# Patient Record
Sex: FEMALE | Race: OTHER | NOT HISPANIC OR LATINO
[De-identification: names, ages, dates, MRNs, and addresses within clinical notes are randomized per-mention and may not be internally consistent; named-entity substitution may affect disease eponyms.]

---

## 2017-07-06 ENCOUNTER — TRANSCRIPTION ENCOUNTER (OUTPATIENT)
Age: 47
End: 2017-07-06

## 2017-08-01 ENCOUNTER — TRANSCRIPTION ENCOUNTER (OUTPATIENT)
Age: 47
End: 2017-08-01

## 2019-01-07 ENCOUNTER — TRANSCRIPTION ENCOUNTER (OUTPATIENT)
Age: 49
End: 2019-01-07

## 2019-01-23 ENCOUNTER — TRANSCRIPTION ENCOUNTER (OUTPATIENT)
Age: 49
End: 2019-01-23

## 2019-03-01 ENCOUNTER — TRANSCRIPTION ENCOUNTER (OUTPATIENT)
Age: 49
End: 2019-03-01

## 2019-04-04 ENCOUNTER — TRANSCRIPTION ENCOUNTER (OUTPATIENT)
Age: 49
End: 2019-04-04

## 2020-08-27 ENCOUNTER — APPOINTMENT (OUTPATIENT)
Dept: SURGERY | Facility: CLINIC | Age: 50
End: 2020-08-27
Payer: COMMERCIAL

## 2020-08-27 VITALS
WEIGHT: 205 LBS | OXYGEN SATURATION: 98 % | DIASTOLIC BLOOD PRESSURE: 86 MMHG | HEIGHT: 62 IN | BODY MASS INDEX: 37.73 KG/M2 | RESPIRATION RATE: 16 BRPM | TEMPERATURE: 96.4 F | SYSTOLIC BLOOD PRESSURE: 129 MMHG | HEART RATE: 88 BPM

## 2020-08-27 DIAGNOSIS — Z01.818 ENCOUNTER FOR OTHER PREPROCEDURAL EXAMINATION: ICD-10-CM

## 2020-08-27 DIAGNOSIS — Z63.5 DISRUPTION OF FAMILY BY SEPARATION AND DIVORCE: ICD-10-CM

## 2020-08-27 DIAGNOSIS — Z83.3 FAMILY HISTORY OF DIABETES MELLITUS: ICD-10-CM

## 2020-08-27 DIAGNOSIS — R13.10 DYSPHAGIA, UNSPECIFIED: ICD-10-CM

## 2020-08-27 DIAGNOSIS — Z98.84 BARIATRIC SURGERY STATUS: ICD-10-CM

## 2020-08-27 DIAGNOSIS — Z78.9 OTHER SPECIFIED HEALTH STATUS: ICD-10-CM

## 2020-08-27 PROCEDURE — 99202 OFFICE O/P NEW SF 15 MIN: CPT

## 2020-08-27 RX ORDER — LEVOTHYROXINE SODIUM 0.17 MG/1
TABLET ORAL
Refills: 0 | Status: ACTIVE | COMMUNITY

## 2020-08-27 SDOH — SOCIAL STABILITY - SOCIAL INSECURITY: DISRUPTION OF FAMILY BY SEPARATION AND DIVORCE: Z63.5

## 2020-08-27 NOTE — ASSESSMENT
[FreeTextEntry1] : 10 he is status post lap band placement with LAP-BAND intolerance.  Question of autoimmune disease secondary to LAP-BAND as well.

## 2020-08-27 NOTE — PHYSICAL EXAM
[Normal] : affect appropriate [Obese, well nourished, in no acute distress] : obese, well nourished, in no acute distress [de-identified] : Normoactive bowel sounds, no hepatosplenomegaly, no masses, non-tender.  Healed wound status post laparoscopic gastric banding.  Port in place infraumbilical

## 2020-08-27 NOTE — PLAN
[FreeTextEntry1] : Removal of lap band and port\par \par The risks benefits and alternatives were discussed at length with drawings and all of her questions were answered.\par The patient appears to understand and wishes to proceed.

## 2020-08-27 NOTE — HISTORY OF PRESENT ILLNESS
[de-identified] : Fernando is a 51 y/o female here for consultation for lap-band removal. She is s/p 2012- gastric banding by Dr. Ortiz. Reported pre-op weight was 190 lbs. Patient denies ever being able to utilize band.  When there is fluid in it she has dysphasia inability to tolerate solids with no fluid she gets no benefit.  Has not lost any weight placement.\par Overall health good.  PCP recommends lap band removal

## 2020-08-27 NOTE — REASON FOR VISIT
[Initial Consult] : an initial consult for [S/P Bariatric Surgery] : s/p bariatric surgery [Nausea/Vomiting] : nausea/vomiting

## 2020-09-10 ENCOUNTER — OUTPATIENT (OUTPATIENT)
Dept: OUTPATIENT SERVICES | Facility: HOSPITAL | Age: 50
LOS: 1 days | End: 2020-09-10
Payer: COMMERCIAL

## 2020-09-10 VITALS
TEMPERATURE: 98 F | WEIGHT: 208.12 LBS | SYSTOLIC BLOOD PRESSURE: 120 MMHG | HEIGHT: 62 IN | HEART RATE: 78 BPM | RESPIRATION RATE: 20 BRPM | DIASTOLIC BLOOD PRESSURE: 70 MMHG

## 2020-09-10 DIAGNOSIS — Z01.818 ENCOUNTER FOR OTHER PREPROCEDURAL EXAMINATION: ICD-10-CM

## 2020-09-10 DIAGNOSIS — E66.01 MORBID (SEVERE) OBESITY DUE TO EXCESS CALORIES: ICD-10-CM

## 2020-09-10 DIAGNOSIS — Z29.9 ENCOUNTER FOR PROPHYLACTIC MEASURES, UNSPECIFIED: ICD-10-CM

## 2020-09-10 LAB
A1C WITH ESTIMATED AVERAGE GLUCOSE RESULT: 6.4 % — HIGH (ref 4–5.6)
ALBUMIN SERPL ELPH-MCNC: 4.1 G/DL — SIGNIFICANT CHANGE UP (ref 3.3–5.2)
ALP SERPL-CCNC: 85 U/L — SIGNIFICANT CHANGE UP (ref 40–120)
ALT FLD-CCNC: 34 U/L — HIGH
ANION GAP SERPL CALC-SCNC: 11 MMOL/L — SIGNIFICANT CHANGE UP (ref 5–17)
AST SERPL-CCNC: 30 U/L — SIGNIFICANT CHANGE UP
BASOPHILS # BLD AUTO: 0.02 K/UL — SIGNIFICANT CHANGE UP (ref 0–0.2)
BASOPHILS NFR BLD AUTO: 0.3 % — SIGNIFICANT CHANGE UP (ref 0–2)
BILIRUB SERPL-MCNC: 0.2 MG/DL — LOW (ref 0.4–2)
BLD GP AB SCN SERPL QL: SIGNIFICANT CHANGE UP
BUN SERPL-MCNC: 16 MG/DL — SIGNIFICANT CHANGE UP (ref 8–20)
CALCIUM SERPL-MCNC: 9.5 MG/DL — SIGNIFICANT CHANGE UP (ref 8.6–10.2)
CHLORIDE SERPL-SCNC: 105 MMOL/L — SIGNIFICANT CHANGE UP (ref 98–107)
CO2 SERPL-SCNC: 22 MMOL/L — SIGNIFICANT CHANGE UP (ref 22–29)
CREAT SERPL-MCNC: 0.66 MG/DL — SIGNIFICANT CHANGE UP (ref 0.5–1.3)
EOSINOPHIL # BLD AUTO: 0.17 K/UL — SIGNIFICANT CHANGE UP (ref 0–0.5)
EOSINOPHIL NFR BLD AUTO: 2.9 % — SIGNIFICANT CHANGE UP (ref 0–6)
ESTIMATED AVERAGE GLUCOSE: 137 MG/DL — HIGH (ref 68–114)
GLUCOSE SERPL-MCNC: 103 MG/DL — HIGH (ref 70–99)
HCT VFR BLD CALC: 42 % — SIGNIFICANT CHANGE UP (ref 34.5–45)
HGB BLD-MCNC: 13.7 G/DL — SIGNIFICANT CHANGE UP (ref 11.5–15.5)
IMM GRANULOCYTES NFR BLD AUTO: 0.2 % — SIGNIFICANT CHANGE UP (ref 0–1.5)
LYMPHOCYTES # BLD AUTO: 1.88 K/UL — SIGNIFICANT CHANGE UP (ref 1–3.3)
LYMPHOCYTES # BLD AUTO: 32.1 % — SIGNIFICANT CHANGE UP (ref 13–44)
MAGNESIUM SERPL-MCNC: 2.2 MG/DL — SIGNIFICANT CHANGE UP (ref 1.6–2.6)
MCHC RBC-ENTMCNC: 29.2 PG — SIGNIFICANT CHANGE UP (ref 27–34)
MCHC RBC-ENTMCNC: 32.6 GM/DL — SIGNIFICANT CHANGE UP (ref 32–36)
MCV RBC AUTO: 89.6 FL — SIGNIFICANT CHANGE UP (ref 80–100)
MONOCYTES # BLD AUTO: 0.53 K/UL — SIGNIFICANT CHANGE UP (ref 0–0.9)
MONOCYTES NFR BLD AUTO: 9.1 % — SIGNIFICANT CHANGE UP (ref 2–14)
NEUTROPHILS # BLD AUTO: 3.24 K/UL — SIGNIFICANT CHANGE UP (ref 1.8–7.4)
NEUTROPHILS NFR BLD AUTO: 55.4 % — SIGNIFICANT CHANGE UP (ref 43–77)
PHOSPHATE SERPL-MCNC: 3.2 MG/DL — SIGNIFICANT CHANGE UP (ref 2.4–4.7)
PLATELET # BLD AUTO: 314 K/UL — SIGNIFICANT CHANGE UP (ref 150–400)
POTASSIUM SERPL-MCNC: 4.5 MMOL/L — SIGNIFICANT CHANGE UP (ref 3.5–5.3)
POTASSIUM SERPL-SCNC: 4.5 MMOL/L — SIGNIFICANT CHANGE UP (ref 3.5–5.3)
PROT SERPL-MCNC: 7.5 G/DL — SIGNIFICANT CHANGE UP (ref 6.6–8.7)
RBC # BLD: 4.69 M/UL — SIGNIFICANT CHANGE UP (ref 3.8–5.2)
RBC # FLD: 13 % — SIGNIFICANT CHANGE UP (ref 10.3–14.5)
SODIUM SERPL-SCNC: 138 MMOL/L — SIGNIFICANT CHANGE UP (ref 135–145)
WBC # BLD: 5.85 K/UL — SIGNIFICANT CHANGE UP (ref 3.8–10.5)
WBC # FLD AUTO: 5.85 K/UL — SIGNIFICANT CHANGE UP (ref 3.8–10.5)

## 2020-09-10 PROCEDURE — G0463: CPT

## 2020-09-10 PROCEDURE — 93010 ELECTROCARDIOGRAM REPORT: CPT

## 2020-09-10 PROCEDURE — 93005 ELECTROCARDIOGRAM TRACING: CPT

## 2020-09-10 NOTE — H&P PST ADULT - ASSESSMENT
51 yo F 51 yo perimenopausal F  Csection x2 (., ), LMP 2020 Hx of hypothyroidism, morbid obesity (BMI 38.1), s/p bariatric surgery in . Patient denies ever being able to utilize band. Has not lost any weight since placement. Overall pt is feeling well, denies fevers, any pain, n/v/c/d, or any bowel or bladder issues. PCP Immatteo recommends lap band removal. Preop assessment prior to removal of laparoscopic adjustable gastric band w/Dr Navarrete on     OPIOID RISK TOOL    ALDO EACH BOX THAT APPLIES AND ADD TOTALS AT THE END    FAMILY HISTORY OF SUBSTANCE ABUSE                 FEMALE         MALE                                                Alcohol                             [  ]1 pt          [  ]3pts                                               Illegal Durgs                     [  ]2 pts        [  ]3pts                                               Rx Drugs                           [  ]4 pts        [  ]4 pts    PERSONAL HISTORY OF SUBSTANCE ABUSE                                                                                          Alcohol                             [  ]3 pts       [  ]3 pts                                               Illegal Drugs                     [  ]4 pts        [  ]4 pts                                               Rx Drugs                           [  ]5 pts        [  ]5 pts    AGE BETWEEN 16-45 YEARS                                      [  ]1 pt         [  ]1 pt    HISTORY OF PREADOLESCENT   SEXUAL ABUSE                                                             [  ]3 pts        [  ]0pts    PSYCHOLOGICAL DISEASE                     ADD, OCD, Bipolar, Schizophrenia        [  ]2 pts         [  ]2 pts                      Depression                                               [  ]1 pt           [  ]1 pt           SCORING TOTAL   (add numbers and type here)              ( 0 )                                     A score of 3 or lower indicated LOW risk for future opioid abuse  A score of 4 to 7 indicated moderate risk for future opioid abuse  A score of 8 or higher indicates a high risk for opioid abuse    CAPRINI SCORE [CLOT]    AGE RELATED RISK FACTORS                                                       MOBILITY RELATED FACTORS  [ x] Age 41-60 years                                            (1 Point)                  [ ] Bed rest                                                        (1 Point)  [ ] Age: 61-74 years                                           (2 Points)                 [ ] Plaster cast                                                   (2 Points)  [ ] Age= 75 years                                              (3 Points)                 [ ] Bed bound for more than 72 hours                 (2 Points)    DISEASE RELATED RISK FACTORS                                               GENDER SPECIFIC FACTORS  [ ] Edema in the lower extremities                       (1 Point)                  [ ] Pregnancy                                                     (1 Point)  [ ] Varicose veins                                               (1 Point)                  [ ] Post-partum < 6 weeks                                   (1 Point)             [x ] BMI > 25 Kg/m2                                            (1 Point)                  [ ] Hormonal therapy  or oral contraception          (1 Point)                 [ ] Sepsis (in the previous month)                        (1 Point)                  [ ] History of pregnancy complications                 (1 point)  [ ] Pneumonia or serious lung disease                                               [ ] Unexplained or recurrent                     (1 Point)           (in the previous month)                               (1 Point)  [ ] Abnormal pulmonary function test                     (1 Point)                 SURGERY RELATED RISK FACTORS  [ ] Acute myocardial infarction                              (1 Point)                 [ ]  Section                                             (1 Point)  [ ] Congestive heart failure (in the previous month)  (1 Point)               [ ] Minor surgery                                                  (1 Point)   [ ] Inflammatory bowel disease                             (1 Point)                 [ ] Arthroscopic surgery                                        (2 Points)  [ ] Central venous access                                      (2 Points)                [x ] General surgery lasting more than 45 minutes   (2 Points)       [ ] Stroke (in the previous month)                          (5 Points)               [ ] Elective arthroplasty                                         (5 Points)                                                                                                                                               HEMATOLOGY RELATED FACTORS                                                 TRAUMA RELATED RISK FACTORS  [ ] Prior episodes of VTE                                     (3 Points)                [ ] Fracture of the hip, pelvis, or leg                       (5 Points)  [ ] Positive family history for VTE                         (3 Points)                 [ ] Acute spinal cord injury (in the previous month)  (5 Points)  [ ] Prothrombin 00203 A                                     (3 Points)                 [ ] Paralysis  (less than 1 month)                             (5 Points)  [ ] Factor V Leiden                                             (3 Points)                  [ ] Multiple Trauma within 1 month                        (5 Points)  [ ] Lupus anticoagulants                                     (3 Points)                                                           [ ] Anticardiolipin antibodies                               (3 Points)                                                       [ ] High homocysteine in the blood                      (3 Points)                                             [ ] Other congenital or acquired thrombophilia      (3 Points)                                                [ ] Heparin induced thrombocytopenia                  (3 Points)                                          Total Score [    4      ]    Caprini Score 0 - 2:  Low Risk, No VTE Prophylaxis required for most patients, encourage ambulation  Caprini Score 3 - 6:  At Risk, pharmacologic VTE prophylaxis is indicated for most patients (in the absence of a contraindication)  Caprini Score Greater than or = 7:  High Risk, pharmacologic VTE prophylaxis is indicated for most patients (in the absence of a contraindication)

## 2020-09-10 NOTE — ASU PATIENT PROFILE, ADULT - PSH
S/P  Section  x 2  S/P Cholecystectomy    Status Post Eye Surgery  lasik  Status Post Gastric Banding

## 2020-09-10 NOTE — H&P PST ADULT - NSICDXPASTMEDICALHX_GEN_ALL_CORE_FT
PAST MEDICAL HISTORY:  Blighted Ovum     Diabetes Mellitus Type II     Latex Sensitivity     Missed      Obesity PAST MEDICAL HISTORY:  Blighted Ovum     Diabetes Mellitus Type II     Latex Sensitivity     Missed      Morbid obesity due to excess calories BMI 38.1    Obesity

## 2020-09-10 NOTE — H&P PST ADULT - NSICDXPASTSURGICALHX_GEN_ALL_CORE_FT
PAST SURGICAL HISTORY:  S/P  Section x 2    S/P Cholecystectomy     Status Post Eye Surgery lasik    Status Post Gastric Banding

## 2020-09-10 NOTE — H&P PST ADULT - NSICDXPROBLEM_GEN_ALL_CORE_FT
PROBLEM DIAGNOSES  Problem: Morbid obesity due to excess calories  Assessment and Plan: preop assessment, med clearance pending, removal of laparoscopic adjustable gastric band 9/16    Problem: Need for prophylactic measure  Assessment and Plan: caprini score 4, moderate risk for dvt SCD ordered, surgical team to assess for dvt prophylaxis

## 2020-09-10 NOTE — H&P PST ADULT - HISTORY OF PRESENT ILLNESS
s/p bariatric surgery and nausea/vomiting.  51 y/o female here for consultation for lap-band removal. She is s/p 2012- gastric banding by Dr. Ortiz. Reported pre-op weight was 190 lbs. Patient denies ever being able to utilize band. When there is fluid in it she has dysphasia inability to tolerate solids with no fluid she gets no benefit. Has not lost any weight placement.  Overall health good. PCP recommends lap band removal. 51 yo perimenopausal [id number] Csection x2 (2005., ), LMP 2020 Hx of hypothyroidism, morbid obesity (BMI 37), s/p bariatric surgery in .  51 y/o female here for consultation for lap-band removal. She is s/p 2012- gastric banding by Dr. Ortiz. Reported pre-op weight was 190 lbs. Patient denies ever being able to utilize band. Has not lost any weight placement. Overall health good. PCP Immatteo recommends lap band removal. Preop assessment 49 yo perimenopausal F  Csection x2 (., ), LMP 2020 Hx of hypothyroidism, morbid obesity (BMI 38.1), s/p bariatric surgery in . Patient denies ever being able to utilize band. Has not lost any weight since placement. Overall pt is feeling well, denies fevers, any pain, n/v/c/d, or any bowel or bladder issues. PCP Immatteo recommends lap band removal. Preop assessment prior to removal of laparoscopic adjustable gastric band w/Dr Navarrete on

## 2020-09-13 ENCOUNTER — APPOINTMENT (OUTPATIENT)
Dept: DISASTER EMERGENCY | Facility: CLINIC | Age: 50
End: 2020-09-13

## 2020-09-14 LAB — SARS-COV-2 N GENE NPH QL NAA+PROBE: NOT DETECTED

## 2020-09-16 PROBLEM — E66.01 MORBID (SEVERE) OBESITY DUE TO EXCESS CALORIES: Chronic | Status: ACTIVE | Noted: 2020-09-10

## 2020-09-23 ENCOUNTER — APPOINTMENT (OUTPATIENT)
Dept: SURGERY | Facility: HOSPITAL | Age: 50
End: 2020-09-23

## 2020-10-01 ENCOUNTER — EMERGENCY (EMERGENCY)
Facility: HOSPITAL | Age: 50
LOS: 1 days | Discharge: ANOTHER HOSPITAL | End: 2020-10-01
Attending: EMERGENCY MEDICINE | Admitting: EMERGENCY MEDICINE
Payer: COMMERCIAL

## 2020-10-01 ENCOUNTER — INPATIENT (INPATIENT)
Facility: HOSPITAL | Age: 50
LOS: 5 days | Discharge: HOME | DRG: 439 | End: 2020-10-07
Attending: INTERNAL MEDICINE | Admitting: INTERNAL MEDICINE
Payer: COMMERCIAL

## 2020-10-01 VITALS
RESPIRATION RATE: 22 BRPM | WEIGHT: 203.93 LBS | TEMPERATURE: 98 F | OXYGEN SATURATION: 99 % | HEIGHT: 62 IN | SYSTOLIC BLOOD PRESSURE: 164 MMHG | DIASTOLIC BLOOD PRESSURE: 97 MMHG | HEART RATE: 98 BPM

## 2020-10-01 VITALS
HEART RATE: 95 BPM | SYSTOLIC BLOOD PRESSURE: 129 MMHG | DIASTOLIC BLOOD PRESSURE: 90 MMHG | OXYGEN SATURATION: 97 % | WEIGHT: 203.93 LBS | TEMPERATURE: 98 F | HEIGHT: 62 IN | RESPIRATION RATE: 16 BRPM

## 2020-10-01 VITALS
RESPIRATION RATE: 18 BRPM | OXYGEN SATURATION: 97 % | HEART RATE: 99 BPM | DIASTOLIC BLOOD PRESSURE: 97 MMHG | SYSTOLIC BLOOD PRESSURE: 153 MMHG

## 2020-10-01 DIAGNOSIS — Z98.890 OTHER SPECIFIED POSTPROCEDURAL STATES: Chronic | ICD-10-CM

## 2020-10-01 LAB
ALBUMIN SERPL ELPH-MCNC: 3.6 G/DL — SIGNIFICANT CHANGE UP (ref 3.3–5)
ALP SERPL-CCNC: 81 U/L — SIGNIFICANT CHANGE UP (ref 30–120)
ALT FLD-CCNC: 42 U/L DA — SIGNIFICANT CHANGE UP (ref 10–60)
ANION GAP SERPL CALC-SCNC: 6 MMOL/L — SIGNIFICANT CHANGE UP (ref 5–17)
APPEARANCE UR: ABNORMAL
AST SERPL-CCNC: 19 U/L — SIGNIFICANT CHANGE UP (ref 10–40)
BACTERIA # UR AUTO: NEGATIVE — SIGNIFICANT CHANGE UP
BASOPHILS # BLD AUTO: 0.02 K/UL — SIGNIFICANT CHANGE UP (ref 0–0.2)
BASOPHILS NFR BLD AUTO: 0.2 % — SIGNIFICANT CHANGE UP (ref 0–2)
BILIRUB SERPL-MCNC: 0.2 MG/DL — SIGNIFICANT CHANGE UP (ref 0.2–1.2)
BILIRUB UR-MCNC: NEGATIVE — SIGNIFICANT CHANGE UP
BUN SERPL-MCNC: 17 MG/DL — SIGNIFICANT CHANGE UP (ref 7–23)
CALCIUM SERPL-MCNC: 9.4 MG/DL — SIGNIFICANT CHANGE UP (ref 8.4–10.5)
CHLORIDE SERPL-SCNC: 102 MMOL/L — SIGNIFICANT CHANGE UP (ref 96–108)
CO2 SERPL-SCNC: 28 MMOL/L — SIGNIFICANT CHANGE UP (ref 22–31)
COLOR SPEC: YELLOW — SIGNIFICANT CHANGE UP
CREAT SERPL-MCNC: 0.88 MG/DL — SIGNIFICANT CHANGE UP (ref 0.5–1.3)
DIFF PNL FLD: NEGATIVE — SIGNIFICANT CHANGE UP
EOSINOPHIL # BLD AUTO: 0.15 K/UL — SIGNIFICANT CHANGE UP (ref 0–0.5)
EOSINOPHIL NFR BLD AUTO: 1.6 % — SIGNIFICANT CHANGE UP (ref 0–6)
EPI CELLS # UR: ABNORMAL
GLUCOSE SERPL-MCNC: 129 MG/DL — HIGH (ref 70–99)
GLUCOSE UR QL: NEGATIVE MG/DL — SIGNIFICANT CHANGE UP
HCG UR QL: NEGATIVE — SIGNIFICANT CHANGE UP
HCT VFR BLD CALC: 41.1 % — SIGNIFICANT CHANGE UP (ref 34.5–45)
HGB BLD-MCNC: 13.3 G/DL — SIGNIFICANT CHANGE UP (ref 11.5–15.5)
IMM GRANULOCYTES NFR BLD AUTO: 0.2 % — SIGNIFICANT CHANGE UP (ref 0–1.5)
KETONES UR-MCNC: NEGATIVE — SIGNIFICANT CHANGE UP
LEUKOCYTE ESTERASE UR-ACNC: NEGATIVE — SIGNIFICANT CHANGE UP
LIDOCAIN IGE QN: 3737 U/L — HIGH (ref 73–393)
LYMPHOCYTES # BLD AUTO: 2.03 K/UL — SIGNIFICANT CHANGE UP (ref 1–3.3)
LYMPHOCYTES # BLD AUTO: 21.3 % — SIGNIFICANT CHANGE UP (ref 13–44)
MCHC RBC-ENTMCNC: 28.8 PG — SIGNIFICANT CHANGE UP (ref 27–34)
MCHC RBC-ENTMCNC: 32.4 GM/DL — SIGNIFICANT CHANGE UP (ref 32–36)
MCV RBC AUTO: 89 FL — SIGNIFICANT CHANGE UP (ref 80–100)
MONOCYTES # BLD AUTO: 0.92 K/UL — HIGH (ref 0–0.9)
MONOCYTES NFR BLD AUTO: 9.7 % — SIGNIFICANT CHANGE UP (ref 2–14)
NEUTROPHILS # BLD AUTO: 6.37 K/UL — SIGNIFICANT CHANGE UP (ref 1.8–7.4)
NEUTROPHILS NFR BLD AUTO: 67 % — SIGNIFICANT CHANGE UP (ref 43–77)
NITRITE UR-MCNC: NEGATIVE — SIGNIFICANT CHANGE UP
NRBC # BLD: 0 /100 WBCS — SIGNIFICANT CHANGE UP (ref 0–0)
PH UR: 7 — SIGNIFICANT CHANGE UP (ref 5–8)
PLATELET # BLD AUTO: 308 K/UL — SIGNIFICANT CHANGE UP (ref 150–400)
POTASSIUM SERPL-MCNC: 3.9 MMOL/L — SIGNIFICANT CHANGE UP (ref 3.5–5.3)
POTASSIUM SERPL-SCNC: 3.9 MMOL/L — SIGNIFICANT CHANGE UP (ref 3.5–5.3)
PROT SERPL-MCNC: 7.7 G/DL — SIGNIFICANT CHANGE UP (ref 6–8.3)
PROT UR-MCNC: NEGATIVE MG/DL — SIGNIFICANT CHANGE UP
RBC # BLD: 4.62 M/UL — SIGNIFICANT CHANGE UP (ref 3.8–5.2)
RBC # FLD: 13.2 % — SIGNIFICANT CHANGE UP (ref 10.3–14.5)
RBC CASTS # UR COMP ASSIST: SIGNIFICANT CHANGE UP /HPF (ref 0–4)
SODIUM SERPL-SCNC: 136 MMOL/L — SIGNIFICANT CHANGE UP (ref 135–145)
SP GR SPEC: 1.01 — SIGNIFICANT CHANGE UP (ref 1.01–1.02)
UROBILINOGEN FLD QL: NEGATIVE MG/DL — SIGNIFICANT CHANGE UP
WBC # BLD: 9.51 K/UL — SIGNIFICANT CHANGE UP (ref 3.8–10.5)
WBC # FLD AUTO: 9.51 K/UL — SIGNIFICANT CHANGE UP (ref 3.8–10.5)
WBC UR QL: SIGNIFICANT CHANGE UP

## 2020-10-01 PROCEDURE — 81025 URINE PREGNANCY TEST: CPT

## 2020-10-01 PROCEDURE — 99285 EMERGENCY DEPT VISIT HI MDM: CPT

## 2020-10-01 PROCEDURE — 99284 EMERGENCY DEPT VISIT MOD MDM: CPT

## 2020-10-01 PROCEDURE — 74177 CT ABD & PELVIS W/CONTRAST: CPT | Mod: 26

## 2020-10-01 PROCEDURE — 96375 TX/PRO/DX INJ NEW DRUG ADDON: CPT

## 2020-10-01 PROCEDURE — 81001 URINALYSIS AUTO W/SCOPE: CPT

## 2020-10-01 PROCEDURE — 99285 EMERGENCY DEPT VISIT HI MDM: CPT | Mod: 25

## 2020-10-01 PROCEDURE — 74019 RADEX ABDOMEN 2 VIEWS: CPT | Mod: 26

## 2020-10-01 PROCEDURE — 80053 COMPREHEN METABOLIC PANEL: CPT

## 2020-10-01 PROCEDURE — 74019 RADEX ABDOMEN 2 VIEWS: CPT

## 2020-10-01 PROCEDURE — 74177 CT ABD & PELVIS W/CONTRAST: CPT

## 2020-10-01 PROCEDURE — 96374 THER/PROPH/DIAG INJ IV PUSH: CPT | Mod: XU

## 2020-10-01 PROCEDURE — 36415 COLL VENOUS BLD VENIPUNCTURE: CPT

## 2020-10-01 PROCEDURE — 85025 COMPLETE CBC W/AUTO DIFF WBC: CPT

## 2020-10-01 PROCEDURE — 83690 ASSAY OF LIPASE: CPT

## 2020-10-01 RX ORDER — ONDANSETRON 8 MG/1
4 TABLET, FILM COATED ORAL ONCE
Refills: 0 | Status: COMPLETED | OUTPATIENT
Start: 2020-10-01 | End: 2020-10-01

## 2020-10-01 RX ORDER — PANTOPRAZOLE SODIUM 20 MG/1
40 TABLET, DELAYED RELEASE ORAL ONCE
Refills: 0 | Status: COMPLETED | OUTPATIENT
Start: 2020-10-01 | End: 2020-10-01

## 2020-10-01 RX ORDER — LEVOTHYROXINE SODIUM 125 MCG
1 TABLET ORAL
Qty: 0 | Refills: 0 | DISCHARGE

## 2020-10-01 RX ORDER — SODIUM CHLORIDE 9 MG/ML
1000 INJECTION INTRAMUSCULAR; INTRAVENOUS; SUBCUTANEOUS ONCE
Refills: 0 | Status: COMPLETED | OUTPATIENT
Start: 2020-10-01 | End: 2020-10-01

## 2020-10-01 RX ADMIN — PANTOPRAZOLE SODIUM 40 MILLIGRAM(S): 20 TABLET, DELAYED RELEASE ORAL at 21:02

## 2020-10-01 RX ADMIN — ONDANSETRON 4 MILLIGRAM(S): 8 TABLET, FILM COATED ORAL at 21:02

## 2020-10-01 RX ADMIN — SODIUM CHLORIDE 1000 MILLILITER(S): 9 INJECTION INTRAMUSCULAR; INTRAVENOUS; SUBCUTANEOUS at 20:50

## 2020-10-01 NOTE — ED ADULT NURSE NOTE - PSH
H/O laparoscopic adjustable gastric banding    H/O:     History of cholecystectomy    S/P LASIK surgery

## 2020-10-01 NOTE — ED PROVIDER NOTE - PMH
Blighted Ovum    Diabetes Mellitus Type II    Latex Sensitivity    Missed     Morbid obesity due to excess calories  BMI 38.1  Obesity

## 2020-10-01 NOTE — ED ADULT NURSE NOTE - PMH
Blighted Ovum    Diabetes Mellitus Type II    History of hypothyroidism    Latex Sensitivity    Missed     Morbid obesity due to excess calories  BMI 38.1  Obesity

## 2020-10-01 NOTE — ED PROVIDER NOTE - ABDOMINAL TENDER
left lower quadrant/epigastric/left upper quadrant/right lower quadrant/right upper quadrant/periumbilical

## 2020-10-01 NOTE — ED PROVIDER NOTE - CHPI ED SYMPTOMS NEG
no diarrhea/no fever/no hematuria/no vomiting/no burning urination/no abdominal distension/no blood in stool/no dysuria

## 2020-10-01 NOTE — ED ADULT NURSE NOTE - ED STAT RN HANDOFF DETAILS
Awaiting transfer to [place]. Report given. Patient notified her family. Patient endorsed to Jackie Burch RN.

## 2020-10-01 NOTE — ED ADULT NURSE NOTE - PMH
Hypothyroid     Blighted ovum    DM (diabetes mellitus)    Hypothyroid    Latex sensitivity    Missed     Obesity

## 2020-10-01 NOTE — ED PROVIDER NOTE - PSH
S/P  Section  x 2  S/P Cholecystectomy    Status Post Eye Surgery  lasik  Status Post Gastric Banding  2011

## 2020-10-01 NOTE — ED ADULT NURSE NOTE - OBJECTIVE STATEMENT
Patient presents to ED with 3 days of upper abdominal pains radiating across abdomen and to back and to lower abdomen with nausea, denies vomiting or diarrhea. Patient tried using Gasex because she felt it might relieve the discomfort but had no effect. Has had normal BMs, last was today. Patient with history of lap band in 2011 with unsuccessful weight loss and is in the process of scheduling the removal.

## 2020-10-01 NOTE — ED ADULT NURSE NOTE - PSH
History of cervical spinal surgery  fusion, anterior approach  S/P  Section  x 2  S/P Cholecystectomy    Status Post Eye Surgery  lasik  Status Post Gastric Banding  2011

## 2020-10-01 NOTE — ED ADULT NURSE NOTE - CHPI ED NUR SYMPTOMS NEG
no vomiting/no diarrhea/no blood in stool/no fever/no dysuria/no hematuria/no abdominal distension/no burning urination/no chills

## 2020-10-01 NOTE — ED PROVIDER NOTE - CARE PLAN
Principal Discharge DX:	Abdominal pain   Principal Discharge DX:	Acute pancreatitis, unspecified complication status, unspecified pancreatitis type

## 2020-10-01 NOTE — ED PROVIDER NOTE - OBJECTIVE STATEMENT
Patient c/o 3 days of abdominal pain. Started in epigastric area but is spreading across upper abdomen and around to back. Some nausea, no vomiting. No fever. No d or c. No urinary c/o. Patient has a lap band for many years. Unsure if there is any fluid in it. Scheduled to have it removed

## 2020-10-01 NOTE — ED ADULT NURSE REASSESSMENT NOTE - NS ED NURSE REASSESS COMMENT FT1
received report and assumed care of pt at change of shift. pt picked up by [facility] for transport to [facility] at this time

## 2020-10-01 NOTE — ED ADULT NURSE NOTE - NSIMPLEMENTINTERV_GEN_ALL_ED
Implemented All Universal Safety Interventions:  [place] to call system. Call bell, personal items and telephone within reach. Instruct patient to call for assistance. Room bathroom lighting operational. Non-slip footwear when patient is off stretcher. Physically safe environment: no spills, clutter or unnecessary equipment. Stretcher in lowest position, wheels locked, appropriate side rails in place.

## 2020-10-02 DIAGNOSIS — E11.9 TYPE 2 DIABETES MELLITUS WITHOUT COMPLICATIONS: ICD-10-CM

## 2020-10-02 DIAGNOSIS — K86.2 CYST OF PANCREAS: ICD-10-CM

## 2020-10-02 DIAGNOSIS — K85.10 BILIARY ACUTE PANCREATITIS WITHOUT NECROSIS OR INFECTION: ICD-10-CM

## 2020-10-02 DIAGNOSIS — Z98.84 BARIATRIC SURGERY STATUS: Chronic | ICD-10-CM

## 2020-10-02 DIAGNOSIS — E03.9 HYPOTHYROIDISM, UNSPECIFIED: ICD-10-CM

## 2020-10-02 DIAGNOSIS — Z90.49 ACQUIRED ABSENCE OF OTHER SPECIFIED PARTS OF DIGESTIVE TRACT: Chronic | ICD-10-CM

## 2020-10-02 DIAGNOSIS — E66.9 OBESITY, UNSPECIFIED: ICD-10-CM

## 2020-10-02 DIAGNOSIS — K85.90 ACUTE PANCREATITIS WITHOUT NECROSIS OR INFECTION, UNSPECIFIED: ICD-10-CM

## 2020-10-02 DIAGNOSIS — Z98.890 OTHER SPECIFIED POSTPROCEDURAL STATES: Chronic | ICD-10-CM

## 2020-10-02 DIAGNOSIS — Z29.9 ENCOUNTER FOR PROPHYLACTIC MEASURES, UNSPECIFIED: ICD-10-CM

## 2020-10-02 DIAGNOSIS — Z98.891 HISTORY OF UTERINE SCAR FROM PREVIOUS SURGERY: Chronic | ICD-10-CM

## 2020-10-02 LAB
ALBUMIN SERPL ELPH-MCNC: 3.3 G/DL — SIGNIFICANT CHANGE UP (ref 3.3–5)
ALBUMIN SERPL ELPH-MCNC: 3.4 G/DL — SIGNIFICANT CHANGE UP (ref 3.3–5)
ALP SERPL-CCNC: 86 U/L — SIGNIFICANT CHANGE UP (ref 40–120)
ALP SERPL-CCNC: 87 U/L — SIGNIFICANT CHANGE UP (ref 40–120)
ALT FLD-CCNC: 36 U/L — SIGNIFICANT CHANGE UP (ref 12–78)
ALT FLD-CCNC: 38 U/L — SIGNIFICANT CHANGE UP (ref 12–78)
AMYLASE P1 CFR SERPL: 279 U/L — HIGH (ref 25–125)
ANION GAP SERPL CALC-SCNC: 7 MMOL/L — SIGNIFICANT CHANGE UP (ref 5–17)
ANION GAP SERPL CALC-SCNC: 7 MMOL/L — SIGNIFICANT CHANGE UP (ref 5–17)
AST SERPL-CCNC: 14 U/L — LOW (ref 15–37)
AST SERPL-CCNC: 18 U/L — SIGNIFICANT CHANGE UP (ref 15–37)
BASOPHILS # BLD AUTO: 0.01 K/UL — SIGNIFICANT CHANGE UP (ref 0–0.2)
BASOPHILS NFR BLD AUTO: 0.1 % — SIGNIFICANT CHANGE UP (ref 0–2)
BILIRUB SERPL-MCNC: 0.4 MG/DL — SIGNIFICANT CHANGE UP (ref 0.2–1.2)
BILIRUB SERPL-MCNC: 0.4 MG/DL — SIGNIFICANT CHANGE UP (ref 0.2–1.2)
BUN SERPL-MCNC: 11 MG/DL — SIGNIFICANT CHANGE UP (ref 7–23)
BUN SERPL-MCNC: 13 MG/DL — SIGNIFICANT CHANGE UP (ref 7–23)
CALCIUM SERPL-MCNC: 8.6 MG/DL — SIGNIFICANT CHANGE UP (ref 8.5–10.1)
CALCIUM SERPL-MCNC: 8.7 MG/DL — SIGNIFICANT CHANGE UP (ref 8.5–10.1)
CHLORIDE SERPL-SCNC: 109 MMOL/L — HIGH (ref 96–108)
CHLORIDE SERPL-SCNC: 110 MMOL/L — HIGH (ref 96–108)
CHOLEST SERPL-MCNC: 193 MG/DL — SIGNIFICANT CHANGE UP (ref 10–199)
CO2 SERPL-SCNC: 24 MMOL/L — SIGNIFICANT CHANGE UP (ref 22–31)
CO2 SERPL-SCNC: 24 MMOL/L — SIGNIFICANT CHANGE UP (ref 22–31)
CREAT SERPL-MCNC: 0.75 MG/DL — SIGNIFICANT CHANGE UP (ref 0.5–1.3)
CREAT SERPL-MCNC: 0.76 MG/DL — SIGNIFICANT CHANGE UP (ref 0.5–1.3)
EOSINOPHIL # BLD AUTO: 0.08 K/UL — SIGNIFICANT CHANGE UP (ref 0–0.5)
EOSINOPHIL NFR BLD AUTO: 0.8 % — SIGNIFICANT CHANGE UP (ref 0–6)
GLUCOSE SERPL-MCNC: 141 MG/DL — HIGH (ref 70–99)
GLUCOSE SERPL-MCNC: 150 MG/DL — HIGH (ref 70–99)
HCT VFR BLD CALC: 38.9 % — SIGNIFICANT CHANGE UP (ref 34.5–45)
HCT VFR BLD CALC: 39.2 % — SIGNIFICANT CHANGE UP (ref 34.5–45)
HDLC SERPL-MCNC: 37 MG/DL — LOW
HGB BLD-MCNC: 12.9 G/DL — SIGNIFICANT CHANGE UP (ref 11.5–15.5)
HGB BLD-MCNC: 13.7 G/DL — SIGNIFICANT CHANGE UP (ref 11.5–15.5)
IMM GRANULOCYTES NFR BLD AUTO: 0.3 % — SIGNIFICANT CHANGE UP (ref 0–1.5)
INR BLD: 1.2 RATIO — HIGH (ref 0.88–1.16)
LACTATE SERPL-SCNC: 0.9 MMOL/L — SIGNIFICANT CHANGE UP (ref 0.7–2)
LIDOCAIN IGE QN: 2664 U/L — HIGH (ref 73–393)
LIPID PNL WITH DIRECT LDL SERPL: 138 MG/DL — HIGH
LYMPHOCYTES # BLD AUTO: 1.45 K/UL — SIGNIFICANT CHANGE UP (ref 1–3.3)
LYMPHOCYTES # BLD AUTO: 14.7 % — SIGNIFICANT CHANGE UP (ref 13–44)
MAGNESIUM SERPL-MCNC: 2.2 MG/DL — SIGNIFICANT CHANGE UP (ref 1.6–2.6)
MAGNESIUM SERPL-MCNC: 2.3 MG/DL — SIGNIFICANT CHANGE UP (ref 1.6–2.6)
MCHC RBC-ENTMCNC: 28.9 PG — SIGNIFICANT CHANGE UP (ref 27–34)
MCHC RBC-ENTMCNC: 29.8 PG — SIGNIFICANT CHANGE UP (ref 27–34)
MCHC RBC-ENTMCNC: 33.2 GM/DL — SIGNIFICANT CHANGE UP (ref 32–36)
MCHC RBC-ENTMCNC: 34.9 GM/DL — SIGNIFICANT CHANGE UP (ref 32–36)
MCV RBC AUTO: 85.4 FL — SIGNIFICANT CHANGE UP (ref 80–100)
MCV RBC AUTO: 87 FL — SIGNIFICANT CHANGE UP (ref 80–100)
MONOCYTES # BLD AUTO: 0.78 K/UL — SIGNIFICANT CHANGE UP (ref 0–0.9)
MONOCYTES NFR BLD AUTO: 7.9 % — SIGNIFICANT CHANGE UP (ref 2–14)
NEUTROPHILS # BLD AUTO: 7.5 K/UL — HIGH (ref 1.8–7.4)
NEUTROPHILS NFR BLD AUTO: 76.2 % — SIGNIFICANT CHANGE UP (ref 43–77)
NRBC # BLD: 0 /100 WBCS — SIGNIFICANT CHANGE UP (ref 0–0)
NRBC # BLD: 0 /100 WBCS — SIGNIFICANT CHANGE UP (ref 0–0)
PHOSPHATE SERPL-MCNC: 3.3 MG/DL — SIGNIFICANT CHANGE UP (ref 2.5–4.5)
PLATELET # BLD AUTO: 294 K/UL — SIGNIFICANT CHANGE UP (ref 150–400)
PLATELET # BLD AUTO: 299 K/UL — SIGNIFICANT CHANGE UP (ref 150–400)
POTASSIUM SERPL-MCNC: 4.1 MMOL/L — SIGNIFICANT CHANGE UP (ref 3.5–5.3)
POTASSIUM SERPL-MCNC: 4.1 MMOL/L — SIGNIFICANT CHANGE UP (ref 3.5–5.3)
POTASSIUM SERPL-SCNC: 4.1 MMOL/L — SIGNIFICANT CHANGE UP (ref 3.5–5.3)
POTASSIUM SERPL-SCNC: 4.1 MMOL/L — SIGNIFICANT CHANGE UP (ref 3.5–5.3)
PROT SERPL-MCNC: 7.6 G/DL — SIGNIFICANT CHANGE UP (ref 6–8.3)
PROT SERPL-MCNC: 7.7 G/DL — SIGNIFICANT CHANGE UP (ref 6–8.3)
PROTHROM AB SERPL-ACNC: 13.9 SEC — HIGH (ref 10.6–13.6)
RBC # BLD: 4.47 M/UL — SIGNIFICANT CHANGE UP (ref 3.8–5.2)
RBC # BLD: 4.59 M/UL — SIGNIFICANT CHANGE UP (ref 3.8–5.2)
RBC # FLD: 13.1 % — SIGNIFICANT CHANGE UP (ref 10.3–14.5)
RBC # FLD: 13.2 % — SIGNIFICANT CHANGE UP (ref 10.3–14.5)
SARS-COV-2 RNA SPEC QL NAA+PROBE: SIGNIFICANT CHANGE UP
SODIUM SERPL-SCNC: 140 MMOL/L — SIGNIFICANT CHANGE UP (ref 135–145)
SODIUM SERPL-SCNC: 141 MMOL/L — SIGNIFICANT CHANGE UP (ref 135–145)
TOTAL CHOLESTEROL/HDL RATIO MEASUREMENT: 5.3 RATIO — SIGNIFICANT CHANGE UP (ref 3.3–7.1)
TRIGL SERPL-MCNC: 93 MG/DL — SIGNIFICANT CHANGE UP (ref 10–149)
TSH SERPL-MCNC: 0.22 UIU/ML — LOW (ref 0.36–3.74)
WBC # BLD: 10.37 K/UL — SIGNIFICANT CHANGE UP (ref 3.8–10.5)
WBC # BLD: 9.85 K/UL — SIGNIFICANT CHANGE UP (ref 3.8–10.5)
WBC # FLD AUTO: 10.37 K/UL — SIGNIFICANT CHANGE UP (ref 3.8–10.5)
WBC # FLD AUTO: 9.85 K/UL — SIGNIFICANT CHANGE UP (ref 3.8–10.5)

## 2020-10-02 PROCEDURE — 76705 ECHO EXAM OF ABDOMEN: CPT | Mod: 26

## 2020-10-02 PROCEDURE — 93010 ELECTROCARDIOGRAM REPORT: CPT

## 2020-10-02 PROCEDURE — 74183 MRI ABD W/O CNTR FLWD CNTR: CPT | Mod: 26

## 2020-10-02 RX ORDER — INSULIN LISPRO 100/ML
VIAL (ML) SUBCUTANEOUS EVERY 6 HOURS
Refills: 0 | Status: DISCONTINUED | OUTPATIENT
Start: 2020-10-02 | End: 2020-10-03

## 2020-10-02 RX ORDER — DEXTROSE MONOHYDRATE, SODIUM CHLORIDE, AND POTASSIUM CHLORIDE 50; .745; 4.5 G/1000ML; G/1000ML; G/1000ML
1000 INJECTION, SOLUTION INTRAVENOUS
Refills: 0 | Status: DISCONTINUED | OUTPATIENT
Start: 2020-10-02 | End: 2020-10-02

## 2020-10-02 RX ORDER — MORPHINE SULFATE 50 MG/1
2 CAPSULE, EXTENDED RELEASE ORAL EVERY 4 HOURS
Refills: 0 | Status: DISCONTINUED | OUTPATIENT
Start: 2020-10-02 | End: 2020-10-06

## 2020-10-02 RX ORDER — ONDANSETRON 8 MG/1
4 TABLET, FILM COATED ORAL EVERY 6 HOURS
Refills: 0 | Status: DISCONTINUED | OUTPATIENT
Start: 2020-10-02 | End: 2020-10-07

## 2020-10-02 RX ORDER — SODIUM CHLORIDE 9 MG/ML
1000 INJECTION, SOLUTION INTRAVENOUS
Refills: 0 | Status: DISCONTINUED | OUTPATIENT
Start: 2020-10-02 | End: 2020-10-07

## 2020-10-02 RX ORDER — MORPHINE SULFATE 50 MG/1
4 CAPSULE, EXTENDED RELEASE ORAL ONCE
Refills: 0 | Status: DISCONTINUED | OUTPATIENT
Start: 2020-10-02 | End: 2020-10-02

## 2020-10-02 RX ORDER — MORPHINE SULFATE 50 MG/1
4 CAPSULE, EXTENDED RELEASE ORAL EVERY 6 HOURS
Refills: 0 | Status: DISCONTINUED | OUTPATIENT
Start: 2020-10-02 | End: 2020-10-06

## 2020-10-02 RX ORDER — SODIUM CHLORIDE 9 MG/ML
1000 INJECTION, SOLUTION INTRAVENOUS
Refills: 0 | Status: DISCONTINUED | OUTPATIENT
Start: 2020-10-02 | End: 2020-10-03

## 2020-10-02 RX ORDER — DEXTROSE 50 % IN WATER 50 %
25 SYRINGE (ML) INTRAVENOUS ONCE
Refills: 0 | Status: DISCONTINUED | OUTPATIENT
Start: 2020-10-02 | End: 2020-10-07

## 2020-10-02 RX ORDER — LEVOTHYROXINE SODIUM 125 MCG
37.5 TABLET ORAL AT BEDTIME
Refills: 0 | Status: DISCONTINUED | OUTPATIENT
Start: 2020-10-02 | End: 2020-10-02

## 2020-10-02 RX ORDER — GLUCAGON INJECTION, SOLUTION 0.5 MG/.1ML
1 INJECTION, SOLUTION SUBCUTANEOUS ONCE
Refills: 0 | Status: DISCONTINUED | OUTPATIENT
Start: 2020-10-02 | End: 2020-10-07

## 2020-10-02 RX ORDER — ONDANSETRON 8 MG/1
4 TABLET, FILM COATED ORAL EVERY 6 HOURS
Refills: 0 | Status: DISCONTINUED | OUTPATIENT
Start: 2020-10-02 | End: 2020-10-02

## 2020-10-02 RX ORDER — DEXTROSE 50 % IN WATER 50 %
15 SYRINGE (ML) INTRAVENOUS ONCE
Refills: 0 | Status: DISCONTINUED | OUTPATIENT
Start: 2020-10-02 | End: 2020-10-07

## 2020-10-02 RX ORDER — DEXTROSE 50 % IN WATER 50 %
12.5 SYRINGE (ML) INTRAVENOUS ONCE
Refills: 0 | Status: DISCONTINUED | OUTPATIENT
Start: 2020-10-02 | End: 2020-10-07

## 2020-10-02 RX ORDER — LEVOTHYROXINE SODIUM 125 MCG
25 TABLET ORAL
Refills: 0 | Status: DISCONTINUED | OUTPATIENT
Start: 2020-10-02 | End: 2020-10-04

## 2020-10-02 RX ORDER — ACETAMINOPHEN 500 MG
650 TABLET ORAL EVERY 6 HOURS
Refills: 0 | Status: DISCONTINUED | OUTPATIENT
Start: 2020-10-02 | End: 2020-10-07

## 2020-10-02 RX ORDER — PANTOPRAZOLE SODIUM 20 MG/1
40 TABLET, DELAYED RELEASE ORAL DAILY
Refills: 0 | Status: DISCONTINUED | OUTPATIENT
Start: 2020-10-02 | End: 2020-10-04

## 2020-10-02 RX ORDER — ENOXAPARIN SODIUM 100 MG/ML
40 INJECTION SUBCUTANEOUS DAILY
Refills: 0 | Status: DISCONTINUED | OUTPATIENT
Start: 2020-10-02 | End: 2020-10-07

## 2020-10-02 RX ORDER — MORPHINE SULFATE 50 MG/1
2 CAPSULE, EXTENDED RELEASE ORAL
Refills: 0 | Status: DISCONTINUED | OUTPATIENT
Start: 2020-10-02 | End: 2020-10-02

## 2020-10-02 RX ADMIN — MORPHINE SULFATE 2 MILLIGRAM(S): 50 CAPSULE, EXTENDED RELEASE ORAL at 19:01

## 2020-10-02 RX ADMIN — ONDANSETRON 4 MILLIGRAM(S): 8 TABLET, FILM COATED ORAL at 13:04

## 2020-10-02 RX ADMIN — MORPHINE SULFATE 2 MILLIGRAM(S): 50 CAPSULE, EXTENDED RELEASE ORAL at 08:25

## 2020-10-02 RX ADMIN — PANTOPRAZOLE SODIUM 40 MILLIGRAM(S): 20 TABLET, DELAYED RELEASE ORAL at 13:04

## 2020-10-02 RX ADMIN — ONDANSETRON 4 MILLIGRAM(S): 8 TABLET, FILM COATED ORAL at 18:31

## 2020-10-02 RX ADMIN — MORPHINE SULFATE 2 MILLIGRAM(S): 50 CAPSULE, EXTENDED RELEASE ORAL at 13:45

## 2020-10-02 RX ADMIN — MORPHINE SULFATE 2 MILLIGRAM(S): 50 CAPSULE, EXTENDED RELEASE ORAL at 18:28

## 2020-10-02 RX ADMIN — MORPHINE SULFATE 4 MILLIGRAM(S): 50 CAPSULE, EXTENDED RELEASE ORAL at 01:15

## 2020-10-02 RX ADMIN — Medication 1: at 12:24

## 2020-10-02 RX ADMIN — MORPHINE SULFATE 4 MILLIGRAM(S): 50 CAPSULE, EXTENDED RELEASE ORAL at 01:00

## 2020-10-02 RX ADMIN — ENOXAPARIN SODIUM 40 MILLIGRAM(S): 100 INJECTION SUBCUTANEOUS at 22:34

## 2020-10-02 RX ADMIN — MORPHINE SULFATE 2 MILLIGRAM(S): 50 CAPSULE, EXTENDED RELEASE ORAL at 13:04

## 2020-10-02 RX ADMIN — MORPHINE SULFATE 2 MILLIGRAM(S): 50 CAPSULE, EXTENDED RELEASE ORAL at 09:00

## 2020-10-02 NOTE — CONSULT NOTE ADULT - SUBJECTIVE AND OBJECTIVE BOX
[facility]     Patient is a 50y old  Female who presents with a chief complaint of     HPI:      HPI:    50y Female for Cardiology Consult    PAST MEDICAL & SURGICAL HISTORY:  Obesity    Missed     Latex sensitivity    DM (diabetes mellitus)    Blighted ovum    Hypothyroid    H/O laparoscopic adjustable gastric banding    S/P LASIK surgery    History of cholecystectomy    H/O:         FAMILY HISTORY:      SOCIAL HISTORY:   Alcohol:  Smoking:    Allergies    latex (Rash)  Novocain (Rash)    Intolerances        MEDICATIONS  (STANDING):    MEDICATIONS  (PRN):      REVIEW OF SYSTEMS:  CONSTITUTIONAL: No fever, weight loss, chills, shakes, or fat  RESPIRATORY: No cough, wheezing, hemoptysis, or shortness of breath  CARDIOVASCULAR: No chest pain, dyspnea, palpitations, dizziness, syncope, paroxysmal nocturnal dyspnea, orthopnea, or arm or leg swelling  GASTROINTESTINAL: No abdominal  or epigastric pain, nausea, vomiting, hematemesis, diarrhea, constipation, melena or bright red bloo  NEUROLOGICAL: No headaches, memory loss, slurred speech, limb weakness, loss of strength, numbness, or tremors  SKIN: No itching, burning, rashes, or lesions  ENDOCRINE: No heat or cold intolerance, or hair loss  MUSCULOSKELETAL: No joint pain or swelling, muscle, back, or extremity pain  HEME/LYMPH: No easy bruising or bleeding gums  ALLERY AND IMMUNOLOGIC: No hives or rash.    Vital Signs Last 24 Hrs  T(C): 36.9 (01 Oct 2020 23:45), Max: 36.9 (01 Oct 2020 23:45)  T(F): 98.5 (01 Oct 2020 23:45), Max: 98.5 (01 Oct 2020 23:45)  HR: 95 (01 Oct 2020 23:45) (95 - 95)  BP: 129/90 (01 Oct 2020 23:45) (129/90 - 129/90)  BP(mean): --  RR: 16 (01 Oct 2020 23:45) (16 - 16)  SpO2: 97% (01 Oct 2020 23:45) (97% - 97%)    PHYSICAL EXAM:  HEAD:  Atraumatic, Normocephalic  EYES: EOMI, PERRLA, conjunctiva and sclera clear  NECK: Supple and normal thyroid.  No JVD or carotid bruit.   HEART: S1, S2 regular , 1/6 soft ejection systolic murmur in mitral area , no thrill and no gallops .  PULMONARY: Bilateral vesicular breathing , few scattered ronchi and few scattered rales are present .  ABDOMEN: Soft nontender and positive bowl sounds   EXTREMITIES:  No clubbing, cyanosis, or pedal  edema  NEUROLOGICAL: AAOX3 , no focal deficit .    LABS:                  BNP      EKG:  ECHO:  IMAGING:    Assessment and Plan :     Will continue to follow during hospital course and give further recommendations as needed . Thanks for your referral . if any questions please contact me at office ([phone])cell [phone]) STEPHAN TADEO MD [place] [id number]  [address]  OFFICE : [phone]  CELL : [phone]    CARDIOLOGY CONSULT :    Patient is a 50y old  Female who presents with a chief complaint of     HPI:      HPI:    50y Female for Cardiology Consult    PAST MEDICAL & SURGICAL HISTORY:  Obesity    Missed     Latex sensitivity    DM (diabetes mellitus)    Blighted ovum    Hypothyroid    H/O laparoscopic adjustable gastric banding    S/P LASIK surgery    History of cholecystectomy    H/O:         FAMILY HISTORY:      SOCIAL HISTORY:   Alcohol:  Smoking:    Allergies    latex (Rash)  Novocain (Rash)    Intolerances        MEDICATIONS  (STANDING):    MEDICATIONS  (PRN):      Vital Signs Last 24 Hrs  T(C): 36.9 (01 Oct 2020 23:45), Max: 36.9 (01 Oct 2020 23:45)  T(F): 98.5 (01 Oct 2020 23:45), Max: 98.5 (01 Oct 2020 23:45)  HR: 95 (01 Oct 2020 23:45) (95 - 95)  BP: 129/90 (01 Oct 2020 23:45) (129/90 - 129/90)  BP(mean): --  RR: 16 (01 Oct 2020 23:45) (16 - 16)  SpO2: 97% (01 Oct 2020 23:45) (97% - 97%)    Assessment and Plan :   FULL CONSULT DICTATED .  50 years old female with H/O Lap banding many years ago , hypothyroidism has epigastric pain and has very elevated amylase and Lipase and has  acute pancreatitis . Patient cardiac wise stable and cleared endoscopy procedure ERCP if needed . Continue I/V hydration as tolerated .  Will continue to follow during hospital course and give further recommendations as needed . Thanks for your referral . if any questions please contact me at office ([phone] [phone])

## 2020-10-02 NOTE — CONSULT NOTE ADULT - PROBLEM SELECTOR RECOMMENDATION 9
doubt gallstone  she is s/p ccy  she does not drink  CT from [place] demonstrates cyst in the body of pancreas ? psuedocyst  no prior history of pancreatitis  ? IPMN  will need MRI with contrast (ordered)  NPO  iv fluid  pain control

## 2020-10-02 NOTE — H&P ADULT - NSICDXPASTMEDICALHX_GEN_ALL_CORE_FT
PAST MEDICAL HISTORY:  Blighted ovum     DM (diabetes mellitus)     Hypothyroid     Latex sensitivity     Missed      Obesity

## 2020-10-02 NOTE — H&P ADULT - ASSESSMENT
51 yo female with  hx of cholecystectomy presents to er with cc c/o 3 days of abdominal pain. Started pain is located mostly  in epigastric area but and also is  spreading across upper abdomen and  to back. Some nausea, no vomiting. No fever.  No urinary c/o. Patient has a lap band for many years. Unsure if there is any fluid in it. Scheduled to have it removed Past med hx is significant for Blighted ovum  ,DM (diabetes mellitus)  ,Hypothyroid  ,Latex sensitivity  ,Missed   ,Obesity. ,H/O laparoscopic adjustable gastric banding  ,H/O:   ,History of cholecystectomy  ,S/P LASIK surgery. Found to have pancreatic enzymes elevation and diagnosed with acute pancreatitis .Admitted for pain management ,iv hydration and GI EVALUATION 51 yo female with  hx of cholecystectomy presents to er with cc c/o 3 days of abdominal pain. Started pain is located mostly  in epigastric area but and also is  spreading across upper abdomen and  to back. Some nausea, no vomiting. No fever.  No urinary c/o. Patient has a lap band for many years. Unsure if there is any fluid in it. Scheduled to have it removed Past med hx is significant for Blighted ovum  ,DM (diabetes mellitus)  ,Hypothyroid  ,Latex sensitivity  ,Missed   ,Obesity. ,H/O laparoscopic adjustable gastric banding  ,H/O:   ,History of cholecystectomy  ,S/P LASIK surgery. Found to have pancreatic enzymes elevation and diagnosed with acute pancreatitis .Admitted for pain management ,iv hydration and GI EVALUATION .Patient was initally presented to [place] ER and CT abdomen was done ,demonstrated MILD PERIPANCREATIC INFLAMMATORY CHANGES SURROUNDING THE PANCREATIC BODY AND TAIL REFLECTIVE OF MILD INTERSTITIAL PANCREATITIS .WELL-DEFINED CYSTIC FLUID COLLECTION WITHOUT RIM ENHANCEMENT IN THE PANCREATIC BODY MEASURING UP TO 2.8 X 2.4 CM WHICH MAY REPRESENT A PSEUDOCYST .Surg evaluation requested .

## 2020-10-02 NOTE — H&P ADULT - ATTENDING COMMENTS
51 yo female with  hx of cholecystectomy presents to er with cc c/o 3 days of abdominal pain. Started pain is located mostly  in epigastric area but and also is  spreading across upper abdomen and  to back. Some nausea, no vomiting. No fever.  No urinary c/o. Patient has a lap band for many years. Unsure if there is any fluid in it. Scheduled to have it removed Past med hx is significant for Blighted ovum  ,DM (diabetes mellitus)  ,Hypothyroid  ,Latex sensitivity  ,Missed   ,Obesity. ,H/O laparoscopic adjustable gastric banding  ,H/O:   ,History of cholecystectomy  ,S/P LASIK surgery. Found to have pancreatic enzymes elevation and diagnosed with acute pancreatitis .Admitted for pain management ,iv hydration and GI EVALUATION 51 yo female with  hx of cholecystectomy presents to er with cc c/o 3 days of abdominal pain. Started pain is located mostly  in epigastric area but and also is  spreading across upper abdomen and  to back. Some nausea, no vomiting. No fever.  No urinary c/o. Patient has a lap band for many years. Unsure if there is any fluid in it. Scheduled to have it removed Past med hx is significant for Blighted ovum  ,DM (diabetes mellitus)  ,Hypothyroid  ,Latex sensitivity  ,Missed   ,Obesity. ,H/O laparoscopic adjustable gastric banding  ,H/O:   ,History of cholecystectomy  ,S/P LASIK surgery. Found to have pancreatic enzymes elevation and diagnosed with acute pancreatitis .Admitted for pain management ,iv hydration and GI EVALUATION.Patient was initally presented to [place] ER and CT abdomen was done ,demonstrated MILD PERIPANCREATIC INFLAMMATORY CHANGES SURROUNDING THE PANCREATIC BODY AND TAIL REFLECTIVE OF MILD INTERSTITIAL PANCREATITIS .WELL-DEFINED CYSTIC FLUID COLLECTION WITHOUT RIM ENHANCEMENT IN THE PANCREATIC BODY MEASURING UP TO 2.8 X 2.4 CM WHICH MAY REPRESENT A PSEUDOCYST .Surg evaluation requested .

## 2020-10-02 NOTE — ED PROVIDER NOTE - CARE PLAN
Principal Discharge DX:	Acute pancreatitis, unspecified complication status, unspecified pancreatitis type

## 2020-10-02 NOTE — H&P ADULT - NEGATIVE GENERAL GENITOURINARY SYMPTOMS
no flank pain L/no gas in urine/no hematuria/no renal colic/no flank pain R/no incontinence/normal urinary frequency/no bladder infections/no urinary hesitancy/no urine discoloration

## 2020-10-02 NOTE — CONSULT NOTE ADULT - SUBJECTIVE AND OBJECTIVE BOX
INCOMPLETE    SURGERY PA CONSULT NOTE, ON BEHALF OF DR. OATES/GENERAL SURGERY:    CHIEF COMPLAINT:  Patient is a 50y old  Female who presents with a chief complaint of abdominal pain (02 Oct 2020 07:35)    HPI:  This is a pleasant 50-yo female with pmhx of hypothyroidism and DM who presented to the ED at [facility] at around midnight last night, with a 3d h/o RUQ abdominal pain with associated nausea.  Pain radiates to the back, and is described as sharp and constant.  Began having chills last night, and decided to come to the hospital.  Denies any association of pain to recent meals, denies any regular ETOH use.  Reports normal BMs, and +passing flatus, but reports some abdominal bloating (which has improved since admission).  Denies CP/SOB/dyspnea/palpitations/dizziness/urinary issues or complaints.      PAST MEDICAL HISTORY:   Obesity  Missed   DM (diabetes mellitus)  Blighted ovum  Hypothyroid    PAST SURGICAL HISTORY:  H/O laparoscopic adjustable gastric banding  S/P LASIK surgery  History of cholecystectomy  H/O:     REVIEW OF SYSTEMS:  General: No acute distress, awake and alert  Head: Normal cephalic/atraumatic  Neck: Denies neck pain or palpable masses, hoarseness or stridor  Lymphatic: Denies cervical or axillary or femoral lymphadenopathy  Chest: No chest pain, cough or hemoptysis  Heart: No chest pain, palpitations  Abdomen: No abdominal distention, nausea or vomiting, no abdominal pain  Extremities: Denies cyanosis, open sores or swollen extremity  Neuro: Denies weakness, paralysis, syncope, loss of vision  Psych: Denies hallucinations, visual disturbances, or depression    MEDICATIONS:  Home Medications:  Synthroid 75 mcg (0.075 mg) oral tablet: 1 tab(s) orally once a day (02 Oct 2020 01:20)    MEDICATIONS  (STANDING):  dextrose 5% + sodium chloride 0.45%. 1000 milliLiter(s) (125 mL/Hr) IV Continuous <Continuous>  dextrose 5%. 1000 milliLiter(s) (50 mL/Hr) IV Continuous <Continuous>  dextrose 50% Injectable 12.5 Gram(s) IV Push once  dextrose 50% Injectable 25 Gram(s) IV Push once  dextrose 50% Injectable 25 Gram(s) IV Push once  enoxaparin Injectable 40 milliGRAM(s) SubCutaneous daily  insulin lispro (HumaLOG) corrective regimen sliding scale   SubCutaneous every 6 hours  levothyroxine Injectable 25 MICROGram(s) IV Push at bedtime  pantoprazole  Injectable 40 milliGRAM(s) IV Push daily    MEDICATIONS  (PRN):  acetaminophen   Tablet .. 650 milliGRAM(s) Oral every 6 hours PRN Temp greater or equal to 38C (100.4F), Mild Pain (1 - 3)  dextrose 40% Gel 15 Gram(s) Oral once PRN Blood Glucose LESS THAN 70 milliGRAM(s)/deciliter  glucagon  Injectable 1 milliGRAM(s) IntraMuscular once PRN Glucose LESS THAN 70 milligrams/deciliter  morphine  - Injectable 2 milliGRAM(s) IV Push every 2 hours PRN Moderate Pain (4 - 6)  ondansetron Injectable 4 milliGRAM(s) IV Push every 6 hours PRN Nausea and/or Vomiting    ALLERGIES:  latex (Rash)  Novocaine (Rash)    SOCIAL HISTORY:  Never smoker  Denies illicit drug use  Occasional (2x a year) ETOH use    FAMILY HISTORY:  Non-contributory    VITALS:  T(C): 36.4 (02 Oct 2020 08:05), Max: 36.9 (01 Oct 2020 23:45)  T(F): 97.5 (02 Oct 2020 08:05), Max: 98.5 (01 Oct 2020 23:45)  HR: 85 (02 Oct 2020 08:05) (85 - 95)  BP: 117/77 (02 Oct 2020 08:05) (107/74 - 132/74)  BP(mean): --  RR: 17 (02 Oct 2020 08:05) (16 - 18)  SpO2: 94% (02 Oct 2020 08:05) (94% - 97%)    PHYSICAL EXAM:  General:  Appears stated age, well-groomed, well-nourished, no distress  HENT:  WNL, no JVD  Chest:  Clear to auscultation bilaterally without W/R/R  Cardiovascular:  Regular rate & rhythm, S1S2  Abdomen: Soft, mild to moderate distention.  Tender at epigastrium.  +BSx4, no rebound/guarding.   Extremities:  Calves soft, NT bilat without edema  Skin:  No rash  Musculoskeletal:  Normal strength  Neuro/Psych:  Alert, oriented to time, place, and person     LABS:                        13.7   10.37 )-----------( 294      ( 02 Oct 2020 06:09 )             39.2     10-02    141  |  110<H>  |  13  ----------------------------<  141<H>  4.1   |  24  |  0.76    Ca    8.7      02 Oct 2020 06:09  Mg     2.3     10-02    TPro  7.7  /  Alb  3.4  /  TBili  0.4  /  DBili  x   /  AST  14<L>  /  ALT  38  /  AlkPhos  86  10-02    RADIOLOGY & ADDITIONAL STUDIES:    ASSESSMENT:  Acute pancreatitis     PLAN: SURGERY PA CONSULT NOTE, ON BEHALF OF DR. SERNA/GENERAL SURGERY:    CHIEF COMPLAINT:  Patient is a 50y old  Female who presents with a chief complaint of abdominal pain (02 Oct 2020 07:35)    HPI:  This is a pleasant 50-yo female with pmhx of hypothyroidism and DM who presented to the ED at [facility] at around midnight last night, with a 3d h/o RUQ abdominal pain with associated nausea.  Pain radiates to the back, and is described as sharp and constant.  Began having chills last night, and decided to come to the hospital.  Denies any association of pain to recent meals, denies any regular ETOH use.  Reports normal BMs, and +passing flatus, but reports some abdominal bloating (which has improved since admission).  Denies CP/SOB/dyspnea/palpitations/dizziness/urinary issues or complaints.      PAST MEDICAL HISTORY:   Obesity  Missed   DM (patient denies DM history though, admits to previous "pre-diabetes", currently no meds/tx for)  Blighted ovum  Hypothyroid    PAST SURGICAL HISTORY:  H/O laparoscopic adjustable gastric banding  S/P LASIK surgery  History of cholecystectomy  H/O:     REVIEW OF SYSTEMS:  General: No acute distress, awake and alert  Head: Normal cephalic/atraumatic  Neck: Denies neck pain or palpable masses, hoarseness or stridor  Lymphatic: Denies cervical or axillary or femoral lymphadenopathy  Chest: No chest pain, cough or hemoptysis  Heart: No chest pain, palpitations  Abdomen: No abdominal distention, nausea or vomiting, no abdominal pain  Extremities: Denies cyanosis, open sores or swollen extremity  Neuro: Denies weakness, paralysis, syncope, loss of vision  Psych: Denies hallucinations, visual disturbances, or depression    MEDICATIONS:  Home Medications:  Synthroid 75 mcg (0.075 mg) oral tablet: 1 tab(s) orally once a day (02 Oct 2020 01:20)    MEDICATIONS  (STANDING):  dextrose 5% + sodium chloride 0.45%. 1000 milliLiter(s) (125 mL/Hr) IV Continuous <Continuous>  dextrose 5%. 1000 milliLiter(s) (50 mL/Hr) IV Continuous <Continuous>  dextrose 50% Injectable 12.5 Gram(s) IV Push once  dextrose 50% Injectable 25 Gram(s) IV Push once  dextrose 50% Injectable 25 Gram(s) IV Push once  enoxaparin Injectable 40 milliGRAM(s) SubCutaneous daily  insulin lispro (HumaLOG) corrective regimen sliding scale   SubCutaneous every 6 hours  levothyroxine Injectable 25 MICROGram(s) IV Push at bedtime  pantoprazole  Injectable 40 milliGRAM(s) IV Push daily    MEDICATIONS  (PRN):  acetaminophen   Tablet .. 650 milliGRAM(s) Oral every 6 hours PRN Temp greater or equal to 38C (100.4F), Mild Pain (1 - 3)  dextrose 40% Gel 15 Gram(s) Oral once PRN Blood Glucose LESS THAN 70 milliGRAM(s)/deciliter  glucagon  Injectable 1 milliGRAM(s) IntraMuscular once PRN Glucose LESS THAN 70 milligrams/deciliter  morphine  - Injectable 2 milliGRAM(s) IV Push every 2 hours PRN Moderate Pain (4 - 6)  ondansetron Injectable 4 milliGRAM(s) IV Push every 6 hours PRN Nausea and/or Vomiting    ALLERGIES:  latex (Rash)  Novocaine (Rash)    SOCIAL HISTORY:  Never smoker  Denies illicit drug use  Occasional (2x a year) ETOH use    FAMILY HISTORY:  Non-contributory    VITALS:  T(C): 36.4 (02 Oct 2020 08:05), Max: 36.9 (01 Oct 2020 23:45)  T(F): 97.5 (02 Oct 2020 08:05), Max: 98.5 (01 Oct 2020 23:45)  HR: 85 (02 Oct 2020 08:05) (85 - 95)  BP: 117/77 (02 Oct 2020 08:05) (107/74 - 132/74)  BP(mean): --  RR: 17 (02 Oct 2020 08:05) (16 - 18)  SpO2: 94% (02 Oct 2020 08:05) (94% - 97%)    PHYSICAL EXAM:  General:  Appears stated age, well-groomed, well-nourished, no distress  HENT:  WNL, no JVD  Chest:  Clear to auscultation bilaterally without W/R/R  Cardiovascular:  Regular rate & rhythm, S1S2  Abdomen: Soft, ND.  Tender at epigastrium and RUQ.  Hypoactive but present BSx4, no rebound/guarding.   Extremities:  Calves soft, NT bilat without edema  Skin:  No rash  Musculoskeletal:  Normal strength  Neuro/Psych:  Alert, oriented to time, place, and person     LABS:                        13.7   10.37 )-----------( 294      ( 02 Oct 2020 06:09 )             39.2     10    141  |  110<H>  |  13  ----------------------------<  141<H>  4.1   |  24  |  0.76    Ca    8.7      02 Oct 2020 06:09  Mg     2.3     10-    TPro  7.7  /  Alb  3.4  /  TBili  0.4  /  DBili  x   /  AST  14<L>  /  ALT  38  /  AlkPhos  86  10-02    RADIOLOGY & ADDITIONAL STUDIES:  EXAM:  CT ABDOMEN AND PELVIS IC                        PROCEDURE DATE:  10/01/2020    INTERPRETATION:  CLINICAL INFORMATION: Upper abdominal pain, history of lap band.  COMPARISON: None.  PROCEDURE:  CT of the Abdomen and Pelvis was performed with intravenous contrast.  Intravenous contrast: 90 ml Omnipaque 350. 10 ml discarded.  Oral contrast: None.  Sagittal and coronal reformats were performed.  FINDINGS:  LOWER CHEST: Mild bibasilar dependent atelectasis. Heart is borderline enlarged.  LIVER: Diffuse hepatic steatosis.  BILE DUCTS: Normal caliber.  GALLBLADDER: Within normal limits.  SPLEEN: Within normal limits.  PANCREAS: Mild peripancreatic inflammatory changes surrounding the pancreatic body and tail with well-defined cystic fluid collection without rim enhancement in the pancreatic body measuring up to 2.8 x 2.4 cm. Pancreatic parenchyma enhances relatively homogeneously. No peripancreatic fluid.  ADRENALS: Within normal limits.  KIDNEYS/URETERS: Within normal limits.  BLADDER: Within normal limits.  REPRODUCTIVE ORGANS: Uterus and adnexa are unremarkable.  BOWEL: Gastric lap band in place. Lap band tubing shows no discontinuity or kinking. Left and position is within normal limits. No evidence of gastric erosion. No bowel obstruction or overt bowel wall thickening. Normal appendix.  PERITONEUM: No ascites, pneumoperitoneum, or loculated collection. No mesenteric lymphadenopathy.  VESSELS: Within normal limits.  RETROPERITONEUM/LYMPH NODES: No lymphadenopathy.  ABDOMINAL WALL: Lap band port within the right para midline ventral abdominal wall. Small fat-containing umbilical hernia.  BONES: Mild degenerative changes of the spine.  IMPRESSION:  Mild peripancreatic inflammatory changes surrounding the pancreatic body and tail reflective of a mild interstitial pancreatitis. Well-defined cystic fluid collection without rim enhancement in the pancreatic body measuring up to 2.8 x 2.4 cm which may represent a pseudocyst. MRI of the abdomen with contrast should be considered for further evaluation.  SITA MATTHEWS D.O. ATTENDING RADIOLOGIST    ASSESSMENT:  Acute pancreatitis with cystic fluid seen in body/tail on CT scan     PLAN:  Patient seen in conjunction with Dr. Serna  Maintain NPO status, continue IVF hydration  No appreciable leukocytosis, fevers, or signs of systemic infection; vitals non-suggestive  Elevated pancreatic enzymes and CT c/w pancreatitis, suggest pancreatic pseudocyst  GI consult pending, possible MRI   No acute surgical intervention required at this time  Will continue to monitor and follow

## 2020-10-02 NOTE — H&P ADULT - HISTORY OF PRESENT ILLNESS
49 yo female with  hx of cholecystectomy presents to er with cc c/o 3 days of abdominal pain. Started pain is located mostly  in epigastric area but and also is  spreading across upper abdomen and  to back. Some nausea, no vomiting. No fever.  No urinary c/o. Patient has a lap band for many years. Unsure if there is any fluid in it. Scheduled to have it removed Past med hx is significant for Blighted ovum  ,DM (diabetes mellitus)  ,Hypothyroid  ,Latex sensitivity  ,Missed   ,Obesity. ,H/O laparoscopic adjustable gastric banding  ,H/O:   ,History of cholecystectomy  ,S/P LASIK surgery. Found to have pancreatic enzymes elevation and diagnosed with acute pancreatitis .Admitted for pain management ,iv hydration and GI EVALUATION 49 yo female with  hx of cholecystectomy presents to er with cc c/o 3 days of abdominal pain. Started pain is located mostly  in epigastric area but and also is  spreading across upper abdomen and  to back. Some nausea, no vomiting. No fever.  No urinary c/o. Patient has a lap band for many years. Unsure if there is any fluid in it. Scheduled to have it removed Past med hx is significant for Blighted ovum  ,DM (diabetes mellitus)  ,Hypothyroid  ,Latex sensitivity  ,Missed   ,Obesity. ,H/O laparoscopic adjustable gastric banding  ,H/O:   ,History of cholecystectomy  ,S/P LASIK surgery. Found to have pancreatic enzymes elevation and diagnosed with acute pancreatitis .Admitted for pain management ,iv hydration and GI EVALUATION .Patient was initally presented to [place] [facility] and CT abdomen was done ,demonstrated MILD PERIPANCREATIC INFLAMMATORY CHANGES SURROUNDING THE PANCREATIC BODY AND TAIL REFLECTIVE OF MILD INTERSTITIAL PANCREATITIS .WELL-DEFINED CYSTIC FLUID COLLECTION WITHOUT RIM ENHANCEMENT IN THE PANCREATIC BODY MEASURING UP TO 2.8 X 2.4 CM WHICH MAY REPRESENT A PSEUDOCYST .Surg evaluation requested .

## 2020-10-02 NOTE — H&P ADULT - NEGATIVE CARDIOVASCULAR SYMPTOMS
no claudication/no dyspnea on exertion/no chest pain/no orthopnea/no peripheral edema/no paroxysmal nocturnal dyspnea/no palpitations

## 2020-10-02 NOTE — H&P ADULT - NEGATIVE MUSCULOSKELETAL SYMPTOMS
no arthritis/no muscle weakness/no arthralgia/no joint swelling/no stiffness/no myalgia/no muscle cramps

## 2020-10-02 NOTE — H&P ADULT - RS GEN PE MLT RESP DETAILS PC
no intercostal retractions/no chest wall tenderness/no rhonchi/no subcutaneous emphysema/no rales/no wheezes/airway patent/clear to auscultation bilaterally/breath sounds equal/good air movement/respirations non-labored

## 2020-10-02 NOTE — H&P ADULT - NEGATIVE GASTROINTESTINAL SYMPTOMS
no change in bowel habits/no steatorrhea/no flatulence/no vomiting/no diarrhea/no constipation/no melena/no hematochezia/no jaundice/no hiccoughs

## 2020-10-02 NOTE — H&P ADULT - NEGATIVE OPHTHALMOLOGIC SYMPTOMS
no lacrimation L/no diplopia/no photophobia/no blurred vision R/no lacrimation R/no blurred vision L

## 2020-10-02 NOTE — ED PROVIDER NOTE - PMH
Blighted ovum    DM (diabetes mellitus)    Hypothyroid    Latex sensitivity    Missed     Obesity

## 2020-10-02 NOTE — CONSULT NOTE ADULT - SUBJECTIVE AND OBJECTIVE BOX
[facility]  Eder Capone PA-C  [address] [place] [address]  [phone]      Chief Complaint:  Patient is a 50y old  Female who presents with a chief complaint of abdominal pain (02 Oct 2020 11:16)      HPI:This is a pleasant 50-yo female with pmhx of hypothyroidism and DM who presented to the ED at [facility] at around midnight last night, with a 3d h/o RUQ abdominal pain with associated nausea.  Pain radiates to the back, and is described as sharp and constant.  Began having chills last night, and decided to come to the hospital.  Denies any association of pain to recent meals, denies any regular ETOH use.  Reports normal BMs, and +passing flatus, but reports some abdominal bloating (which has improved since admission).  Denies CP/SOB/dyspnea/palpitations/dizziness/urinary issues or complaints.      Allergies:  latex (Rash)  Novocain (Rash)      Medications:  acetaminophen   Tablet .. 650 milliGRAM(s) Oral every 6 hours PRN  dextrose 40% Gel 15 Gram(s) Oral once PRN  dextrose 5% + sodium chloride 0.45%. 1000 milliLiter(s) IV Continuous <Continuous>  dextrose 5%. 1000 milliLiter(s) IV Continuous <Continuous>  dextrose 50% Injectable 12.5 Gram(s) IV Push once  dextrose 50% Injectable 25 Gram(s) IV Push once  dextrose 50% Injectable 25 Gram(s) IV Push once  enoxaparin Injectable 40 milliGRAM(s) SubCutaneous daily  glucagon  Injectable 1 milliGRAM(s) IntraMuscular once PRN  insulin lispro (HumaLOG) corrective regimen sliding scale   SubCutaneous every 6 hours  levothyroxine Injectable 25 MICROGram(s) IV Push at bedtime  morphine  - Injectable 2 milliGRAM(s) IV Push every 4 hours PRN  morphine  - Injectable 4 milliGRAM(s) IV Push every 6 hours PRN  ondansetron Injectable 4 milliGRAM(s) IV Push every 6 hours PRN  pantoprazole  Injectable 40 milliGRAM(s) IV Push daily      PMHX/PSHX:  Obesity    Missed     Latex sensitivity    DM (diabetes mellitus)    Blighted ovum    Hypothyroid    H/O laparoscopic adjustable gastric banding    S/P LASIK surgery    History of cholecystectomy    H/O:         Family history:  htn; no pancreatic disorders    Social History: no etoh use     ROS:     General:  No wt loss, fevers, chills, night sweats, fatigue,   Eyes:  Good vision, no reported pain  ENT:  No sore throat, pain, runny nose, dysphagia  CV:  No pain, palpitations, hypo/hypertension  Resp:  No dyspnea, cough, tachypnea, wheezing  GI:  + pain, No nausea, No vomiting, No diarrhea, No constipation, No weight loss, No fever, No pruritis, No rectal bleeding, No tarry stools, No dysphagia,  :  No pain, bleeding, incontinence, nocturia  Muscle:  No pain, weakness  Neuro:  No weakness, tingling, memory problems  Psych:  No fatigue, insomnia, mood problems, depression  Endocrine:  No polyuria, polydipsia, cold/heat intolerance  Heme:  No petechiae, ecchymosis, easy bruisability  Skin:  No rash, tattoos, scars, edema      PHYSICAL EXAM:   Vital Signs:  Vital Signs Last 24 Hrs  T(C): 36.8 (02 Oct 2020 13:00), Max: 36.9 (01 Oct 2020 23:45)  T(F): 98.3 (02 Oct 2020 13:00), Max: 98.5 (01 Oct 2020 23:45)  HR: 89 (02 Oct 2020 13:00) (85 - 95)  BP: 113/50 (02 Oct 2020 13:00) (107/74 - 132/74)  BP(mean): --  RR: 20 (02 Oct 2020 13:00) (16 - 20)  SpO2: 97% (02 Oct 2020 13:00) (94% - 97%)  Daily Height in cm: 157.48 (01 Oct 2020 23:45)    Daily     GENERAL:  Appears stated age, well-groomed, well-nourished, no distress  HEENT:  NC/AT,  conjunctivae clear and pink, no thyromegaly, nodules, adenopathy, no JVD, sclera -anicteric  CHEST:  Full & symmetric excursion, no increased effort, breath sounds clear  HEART:  Regular rhythm, S1, S2, no murmur/rub/S3/S4, no abdominal bruit, no edema  ABDOMEN:  Soft, mildly tender, no rt  EXTEREMITIES:  no cyanosis,clubbing or edema  SKIN:  No rash/erythema/ecchymoses/petechiae/wounds/abscess/warm/dry  NEURO:  Alert, oriented, no asterixis, no tremor, no encephalopathy    LABS:                        12.9   9.85  )-----------( 299      ( 02 Oct 2020 11:42 )             38.9     10-    140  |  109<H>  |  11  ----------------------------<  150<H>  4.1   |  24  |  0.75    Ca    8.6      02 Oct 2020 11:42  Phos  3.3     10-  Mg     2.2     10-    TPro  7.6  /  Alb  3.3  /  TBili  0.4  /  DBili  x   /  AST  18  /  ALT  36  /  AlkPhos  87  10-02    LIVER FUNCTIONS - ( 02 Oct 2020 11:42 )  Alb: 3.3 g/dL / Pro: 7.6 g/dL / ALK PHOS: 87 U/L / ALT: 36 U/L / AST: 18 U/L / GGT: x           PT/INR - ( 02 Oct 2020 11:42 )   PT: 13.9 sec;   INR: 1.20 ratio             Amylase [id number]      Lipase [id number]       Ammonia--      Imaging:

## 2020-10-02 NOTE — H&P ADULT - NSICDXPASTSURGICALHX_GEN_ALL_CORE_FT
PAST SURGICAL HISTORY:  H/O laparoscopic adjustable gastric banding     H/O:      History of cholecystectomy     S/P LASIK surgery

## 2020-10-02 NOTE — ED PROVIDER NOTE - OBJECTIVE STATEMENT
51 yo female hx of cholecystectomy c/o 3 days of abdominal pain. Started in epigastric area but is spreading across upper abdomen and around to back. Some nausea, no vomiting. No fever. No d or c. No urinary c/o. Patient has a lap band for many years. Unsure if there is any fluid in it. Scheduled to have it removed  · Presenting Symptoms: NAUSEA, PAIN  · Negative Findings: no abdominal distension, no blood in stool, no burning urination, no diarrhea, no dysuria, no fever, no hematuria, no vomiting  · Location: abdomen  · Laterality: generalized  · Area: upper  · Radiation: back  · Timing: constant, worsening  · Duration: day(s)  3  · Quality: band-like  · Severity: MODERATE  · Context: unknown  · Recent Exposure To: none known  · Aggravated Factors: none  · Relieving Factors: none    transferred from [place] for pancreatitis seen on CT and labs, accepted by Dr. Gibbs, discussed with Dr. Kaminski

## 2020-10-02 NOTE — H&P ADULT - NSHPLABSRESULTS_GEN_ALL_CORE
Patient was initally presented to [facility] and CT abdomen was done ,demonstrated MILD PERIPANCREATIC INFLAMMATORY CHANGES SURROUNDING THE PANCREATIC BODY AND TAIL REFLECTIVE OF MILD INTERSTITIAL PANCREATITIS .WELL-DEFINED CYSTIC FLUID COLLECTION WITHOUT RIM ENHANCEMENT IN THE PANCREATIC BODY MEASURING UP TO 2.8 X 2.4 CM WHICH MAY REPRESENT A PSEUDOCYST .Surg evaluation requested .

## 2020-10-02 NOTE — ED PROVIDER NOTE - PHYSICAL EXAMINATION
Gen: Alert, NAD  Head/eyes: NC/AT, PERRL  ENT: airway patent  Neck: supple, no tenderness/meningismus/JVD, Trachea midline  Pulm/lung: Bilateral clear BS, normal resp effort, no wheeze/stridor/retractions  CV/heart: RRR, no M/R/G, +2 dist pulses (radial, pedal DP/PT, popliteal)  GI/Abd: soft, +ttp epigastric/ND, +BS, no guarding/rebound tenderness  Musculoskeletal: no edema/erythema/cyanosis, FROM in all extremities, no C/T/L spine ttp  Skin: no rash, no vesicles, no petechaie, no ecchymosis, no swelling  Neuro: AAOx3, CN 2-12 intact, normal sensation, 5/5 motor strength in all extremities, normal gait, no dysmetria

## 2020-10-03 PROBLEM — Z86.39 PERSONAL HISTORY OF OTHER ENDOCRINE, NUTRITIONAL AND METABOLIC DISEASE: Chronic | Status: ACTIVE | Noted: 2020-10-01

## 2020-10-03 LAB
A1C WITH ESTIMATED AVERAGE GLUCOSE RESULT: 6.6 % — HIGH (ref 4–5.6)
ALBUMIN SERPL ELPH-MCNC: 3.1 G/DL — LOW (ref 3.3–5)
ALP SERPL-CCNC: 86 U/L — SIGNIFICANT CHANGE UP (ref 40–120)
ALT FLD-CCNC: 34 U/L — SIGNIFICANT CHANGE UP (ref 12–78)
AMYLASE P1 CFR SERPL: 94 U/L — SIGNIFICANT CHANGE UP (ref 25–125)
ANION GAP SERPL CALC-SCNC: 6 MMOL/L — SIGNIFICANT CHANGE UP (ref 5–17)
AST SERPL-CCNC: 13 U/L — LOW (ref 15–37)
BASOPHILS # BLD AUTO: 0.02 K/UL — SIGNIFICANT CHANGE UP (ref 0–0.2)
BASOPHILS NFR BLD AUTO: 0.2 % — SIGNIFICANT CHANGE UP (ref 0–2)
BILIRUB SERPL-MCNC: 0.4 MG/DL — SIGNIFICANT CHANGE UP (ref 0.2–1.2)
BUN SERPL-MCNC: 8 MG/DL — SIGNIFICANT CHANGE UP (ref 7–23)
CALCIUM SERPL-MCNC: 8.7 MG/DL — SIGNIFICANT CHANGE UP (ref 8.5–10.1)
CHLORIDE SERPL-SCNC: 106 MMOL/L — SIGNIFICANT CHANGE UP (ref 96–108)
CO2 SERPL-SCNC: 29 MMOL/L — SIGNIFICANT CHANGE UP (ref 22–31)
CREAT SERPL-MCNC: 0.83 MG/DL — SIGNIFICANT CHANGE UP (ref 0.5–1.3)
EOSINOPHIL # BLD AUTO: 0.09 K/UL — SIGNIFICANT CHANGE UP (ref 0–0.5)
EOSINOPHIL NFR BLD AUTO: 0.8 % — SIGNIFICANT CHANGE UP (ref 0–6)
ESTIMATED AVERAGE GLUCOSE: 143 MG/DL — HIGH (ref 68–114)
GLUCOSE SERPL-MCNC: 130 MG/DL — HIGH (ref 70–99)
HCT VFR BLD CALC: 39.7 % — SIGNIFICANT CHANGE UP (ref 34.5–45)
HGB BLD-MCNC: 13.5 G/DL — SIGNIFICANT CHANGE UP (ref 11.5–15.5)
IMM GRANULOCYTES NFR BLD AUTO: 0.3 % — SIGNIFICANT CHANGE UP (ref 0–1.5)
LIDOCAIN IGE QN: 429 U/L — HIGH (ref 73–393)
LYMPHOCYTES # BLD AUTO: 19.2 % — SIGNIFICANT CHANGE UP (ref 13–44)
LYMPHOCYTES # BLD AUTO: 2.12 K/UL — SIGNIFICANT CHANGE UP (ref 1–3.3)
MCHC RBC-ENTMCNC: 29.1 PG — SIGNIFICANT CHANGE UP (ref 27–34)
MCHC RBC-ENTMCNC: 34 GM/DL — SIGNIFICANT CHANGE UP (ref 32–36)
MCV RBC AUTO: 85.6 FL — SIGNIFICANT CHANGE UP (ref 80–100)
MONOCYTES # BLD AUTO: 1.11 K/UL — HIGH (ref 0–0.9)
MONOCYTES NFR BLD AUTO: 10 % — SIGNIFICANT CHANGE UP (ref 2–14)
NEUTROPHILS # BLD AUTO: 7.68 K/UL — HIGH (ref 1.8–7.4)
NEUTROPHILS NFR BLD AUTO: 69.5 % — SIGNIFICANT CHANGE UP (ref 43–77)
NRBC # BLD: 0 /100 WBCS — SIGNIFICANT CHANGE UP (ref 0–0)
PLATELET # BLD AUTO: 306 K/UL — SIGNIFICANT CHANGE UP (ref 150–400)
POTASSIUM SERPL-MCNC: 3.7 MMOL/L — SIGNIFICANT CHANGE UP (ref 3.5–5.3)
POTASSIUM SERPL-SCNC: 3.7 MMOL/L — SIGNIFICANT CHANGE UP (ref 3.5–5.3)
PROT SERPL-MCNC: 7.6 G/DL — SIGNIFICANT CHANGE UP (ref 6–8.3)
RBC # BLD: 4.64 M/UL — SIGNIFICANT CHANGE UP (ref 3.8–5.2)
RBC # FLD: 13.1 % — SIGNIFICANT CHANGE UP (ref 10.3–14.5)
SARS-COV-2 IGG SERPL QL IA: NEGATIVE — SIGNIFICANT CHANGE UP
SARS-COV-2 IGM SERPL IA-ACNC: <0.1 INDEX — SIGNIFICANT CHANGE UP
SODIUM SERPL-SCNC: 141 MMOL/L — SIGNIFICANT CHANGE UP (ref 135–145)
WBC # BLD: 11.05 K/UL — HIGH (ref 3.8–10.5)
WBC # FLD AUTO: 11.05 K/UL — HIGH (ref 3.8–10.5)

## 2020-10-03 RX ORDER — LANOLIN ALCOHOL/MO/W.PET/CERES
5 CREAM (GRAM) TOPICAL ONCE
Refills: 0 | Status: COMPLETED | OUTPATIENT
Start: 2020-10-03 | End: 2020-10-03

## 2020-10-03 RX ORDER — SODIUM CHLORIDE 9 MG/ML
1000 INJECTION INTRAMUSCULAR; INTRAVENOUS; SUBCUTANEOUS
Refills: 0 | Status: DISCONTINUED | OUTPATIENT
Start: 2020-10-03 | End: 2020-10-04

## 2020-10-03 RX ORDER — INSULIN LISPRO 100/ML
VIAL (ML) SUBCUTANEOUS
Refills: 0 | Status: DISCONTINUED | OUTPATIENT
Start: 2020-10-03 | End: 2020-10-07

## 2020-10-03 RX ADMIN — MORPHINE SULFATE 2 MILLIGRAM(S): 50 CAPSULE, EXTENDED RELEASE ORAL at 11:19

## 2020-10-03 RX ADMIN — ONDANSETRON 4 MILLIGRAM(S): 8 TABLET, FILM COATED ORAL at 05:34

## 2020-10-03 RX ADMIN — MORPHINE SULFATE 2 MILLIGRAM(S): 50 CAPSULE, EXTENDED RELEASE ORAL at 18:12

## 2020-10-03 RX ADMIN — Medication 25 MICROGRAM(S): at 05:35

## 2020-10-03 RX ADMIN — MORPHINE SULFATE 2 MILLIGRAM(S): 50 CAPSULE, EXTENDED RELEASE ORAL at 23:55

## 2020-10-03 RX ADMIN — MORPHINE SULFATE 2 MILLIGRAM(S): 50 CAPSULE, EXTENDED RELEASE ORAL at 05:49

## 2020-10-03 RX ADMIN — MORPHINE SULFATE 2 MILLIGRAM(S): 50 CAPSULE, EXTENDED RELEASE ORAL at 05:34

## 2020-10-03 RX ADMIN — PANTOPRAZOLE SODIUM 40 MILLIGRAM(S): 20 TABLET, DELAYED RELEASE ORAL at 11:04

## 2020-10-03 RX ADMIN — ENOXAPARIN SODIUM 40 MILLIGRAM(S): 100 INJECTION SUBCUTANEOUS at 11:04

## 2020-10-03 RX ADMIN — MORPHINE SULFATE 2 MILLIGRAM(S): 50 CAPSULE, EXTENDED RELEASE ORAL at 18:38

## 2020-10-03 RX ADMIN — MORPHINE SULFATE 2 MILLIGRAM(S): 50 CAPSULE, EXTENDED RELEASE ORAL at 12:00

## 2020-10-03 RX ADMIN — Medication 5 MILLIGRAM(S): at 23:52

## 2020-10-03 RX ADMIN — SODIUM CHLORIDE 100 MILLILITER(S): 9 INJECTION INTRAMUSCULAR; INTRAVENOUS; SUBCUTANEOUS at 10:46

## 2020-10-03 NOTE — PROGRESS NOTE ADULT - SUBJECTIVE AND OBJECTIVE BOX
PROGRESS NOTE  Patient is a 50y old  Female who presents with a chief complaint of abdominal pain (03 Oct 2020 12:25)  LATE ENTRY- patient was seen and examined earlier today . Plan of care was discussed with med staff and unit coordinator .   Chart and available morning labs /imaging are reviewed electronically , urgent issues addressed . More information  is being added upon completion of rounds , when more information is collected and management discussed with consultants , medical staff and social service/case management on the floor   OVERNIGHT  No new issues reported by medical staff . All above noted Patient is resting in a bed comfortably . .No distress noted   Denies nausea and abd pain MRI REPORT is given to the patient and all questions are answered to the best of my knowledge diet advanced as per surgery recommendation   HPI:  51 yo female with  hx of cholecystectomy presents to er with cc c/o 3 days of abdominal pain. Started pain is located mostly  in epigastric area but and also is  spreading across upper abdomen and  to back. Some nausea, no vomiting. No fever.  No urinary c/o. Patient has a lap band for many years. Unsure if there is any fluid in it. Scheduled to have it removed Past med hx is significant for Blighted ovum  ,DM (diabetes mellitus)  ,Hypothyroid  ,Latex sensitivity  ,Missed   ,Obesity. ,H/O laparoscopic adjustable gastric banding  ,H/O:   ,History of cholecystectomy  ,S/P LASIK surgery. Found to have pancreatic enzymes elevation and diagnosed with acute pancreatitis .Admitted for pain management ,iv hydration and GI EVALUATION .Patient was initally presented to [place] ER and CT abdomen was done ,demonstrated MILD PERIPANCREATIC INFLAMMATORY CHANGES SURROUNDING THE PANCREATIC BODY AND TAIL REFLECTIVE OF MILD INTERSTITIAL PANCREATITIS .WELL-DEFINED CYSTIC FLUID COLLECTION WITHOUT RIM ENHANCEMENT IN THE PANCREATIC BODY MEASURING UP TO 2.8 X 2.4 CM WHICH MAY REPRESENT A PSEUDOCYST .Surg evaluation requested . (02 Oct 2020 07:35)    PAST MEDICAL & SURGICAL HISTORY:  Obesity    Missed     Latex sensitivity    DM (diabetes mellitus)    Blighted ovum    Hypothyroid    History of hypothyroidism    Morbid obesity due to excess calories  BMI 38.1    Latex Sensitivity    Blighted Ovum    Obesity    Missed     Diabetes Mellitus Type II    H/O laparoscopic adjustable gastric banding    S/P LASIK surgery    History of cholecystectomy    H/O:     History of cervical spinal surgery  fusion, anterior approach    Status Post Gastric Banding      Status Post Eye Surgery  lasik    S/P  Section  x 2    S/P Cholecystectomy        MEDICATIONS  (STANDING):  dextrose 5%. 1000 milliLiter(s) (50 mL/Hr) IV Continuous <Continuous>  dextrose 50% Injectable 12.5 Gram(s) IV Push once  dextrose 50% Injectable 25 Gram(s) IV Push once  dextrose 50% Injectable 25 Gram(s) IV Push once  enoxaparin Injectable 40 milliGRAM(s) SubCutaneous daily  insulin lispro (HumaLOG) corrective regimen sliding scale   SubCutaneous every 6 hours  levothyroxine Injectable 25 MICROGram(s) IV Push <User Schedule>  pantoprazole  Injectable 40 milliGRAM(s) IV Push daily  sodium chloride 0.9%. 1000 milliLiter(s) (100 mL/Hr) IV Continuous <Continuous>    MEDICATIONS  (PRN):  acetaminophen   Tablet .. 650 milliGRAM(s) Oral every 6 hours PRN Temp greater or equal to 38C (100.4F), Mild Pain (1 - 3)  dextrose 40% Gel 15 Gram(s) Oral once PRN Blood Glucose LESS THAN 70 milliGRAM(s)/deciliter  glucagon  Injectable 1 milliGRAM(s) IntraMuscular once PRN Glucose LESS THAN 70 milligrams/deciliter  morphine  - Injectable 2 milliGRAM(s) IV Push every 4 hours PRN Moderate Pain (4 - 6)  morphine  - Injectable 4 milliGRAM(s) IV Push every 6 hours PRN Severe Pain (7 - 10)  ondansetron Injectable 4 milliGRAM(s) IV Push every 6 hours PRN Nausea and/or Vomiting      OBJECTIVE    T(C): 37.3 (10-03-20 @ 12:45), Max: 37.3 (10-03-20 @ 12:45)  HR: 87 (10-03-20 @ 12:45) (87 - 105)  BP: 111/79 (10-03-20 @ 12:45) (111/79 - 120/84)  RR: 20 (10-03-20 @ 12:45) (17 - 20)  SpO2: 94% (10-03-20 @ 12:45) (93% - 95%)  Wt(kg): --  I&O's Summary    02 Oct 2020 07:01  -  03 Oct 2020 07:00  --------------------------------------------------------  IN: 3000 mL / OUT: 0 mL / NET: 3000 mL          REVIEW OF SYSTEMS:  CONSTITUTIONAL: No fever, weight loss, or fatigue  EYES: No eye pain, visual disturbances, or discharge  ENMT:   No sinus or throat pain  NECK: No pain or stiffness  RESPIRATORY: No cough, wheezing, chills or hemoptysis; No shortness of breath  CARDIOVASCULAR: No chest pain, palpitations, dizziness, or leg swelling  GASTROINTESTINAL: No abdominal pain. No nausea, vomiting; No diarrhea or constipation. No melena or hematochezia.  GENITOURINARY: No dysuria, frequency, hematuria, or incontinence  NEUROLOGICAL: No headaches, memory loss, loss of strength, numbness, or tremors  SKIN: No itching, burning, rashes, or lesions   MUSCULOSKELETAL: No joint pain or swelling; No muscle, back, or extremity pain    PHYSICAL EXAM:  Appearance: NAD. VS past 24 hrs -as above   HEENT:   Moist oral mucosa. Conjunctiva clear b/l.   Neck : supple  Respiratory: Lungs CTAB.  Gastrointestinal:  Soft, nontender. No rebound. No rigidity. BS present	  Cardiovascular: RRR ,S1S2 present  Neurologic: Non-focal. Moving all extremities.  Extremities: No edema. No erythema. No calf tenderness.  Skin: No rashes, No ecchymoses, No cyanosis.	  wounds ,skin lesions-See skin assesment flow sheet   LABS:                        13.5   11.05 )-----------( 306      ( 03 Oct 2020 06:55 )             39.7     10-    141  |  106  |  8   ----------------------------<  130<H>  3.7   |  29  |  0.83    Ca    8.7      03 Oct 2020 06:55  Phos  3.3     10-  Mg     2.2     10-    TPro  7.6  /  Alb  3.1<L>  /  TBili  0.4  /  DBili  x   /  AST  13<L>  /  ALT  34  /  AlkPhos  86  10-03    CAPILLARY BLOOD GLUCOSE      POCT Blood Glucose.: 117 mg/dL (03 Oct 2020 11:51)  POCT Blood Glucose.: 143 mg/dL (03 Oct 2020 05:45)  POCT Blood Glucose.: 146 mg/dL (02 Oct 2020 23:26)  POCT Blood Glucose.: 112 mg/dL (02 Oct 2020 16:57)    PT/INR - ( 02 Oct 2020 11:42 )   PT: 13.9 sec;   INR: 1.20 ratio           Urinalysis Basic - ( 01 Oct 2020 20:52 )    Color: Yellow / Appearance: Slightly Turbid / S.010 / pH: x  Gluc: x / Ketone: Negative  / Bili: Negative / Urobili: Negative mg/dL   Blood: x / Protein: Negative mg/dL / Nitrite: Negative   Leuk Esterase: Negative / RBC: 0-2 /HPF / WBC 0-2   Sq Epi: x / Non Sq Epi: Moderate / Bacteria: Negative        RADIOLOGY & ADDITIONAL TESTS:< from: MR Abdomen w/wo IV Cont (10.02.20 @ 16:40) >  EXAM:  MR ABDOMEN WAW IC                            PROCEDURE DATE:  10/02/2020          INTERPRETATION:  MR ABDOMEN WITHOUT AND WITH IV CONTRAST    CLINICAL INFORMATION: pancreatitis with cyst of the pancreas    TECHNIQUE: Multiplanar T1-weighted,T2-weighted, and diffusion weighted sequences were obtained of the abdomen, including dynamic post-contrast T1-weighted sequences.  3D heavily T2-weighted thin-section MRCP was also performed.    Field Strength: 1.5T    Contrast: 9.5 cc Gadavist.    COMPARISON: Same-day gallbladder ultrasound.    FINDINGS:    LOWER CHEST: Clear.    LIVER: Diffuse steatosis.  BILE DUCTS: 6 mm common bile duct with normal distal tapering. No filling defect. No intrahepatic dilatation.  GALLBLADDER: Unremarkable gallbladder fossa.  SPLEEN: Normal.  PANCREAS: Interstitial edematous pancreatitis. No main duct dilatation. No solid mass. No divisum. 2.8 cm cyst in the tail without internal complexity.  ADRENALS: Normal.  KIDNEYS/URETERS: Symmetric nephrograms. No hydronephrosis.    VISUALIZED PORTIONS:    BOWEL: Gastric banding. No bowel dilatation.  PERITONEUM: No ascites.  VESSELS:  Normal caliber aorta.  RETROPERITONEUM/LYMPH NODES: No adenopathy.  ABDOMINAL WALL: Normal.  BONES: No aggressive lesion.    IMPRESSION:    Interstitial edematous pancreatitis. No divisum.    2.8 cm cyst in the distal pancreatic body without concerning features. MRI surveillance every 6 months for a total of 2 years is recommended.    No biliary dilatation or choledocholithiasis.    < end of copied text >     reviewed elctronically  ASSESSMENT/PLAN:  45minutes spent on this visit, 50% visit time spent in care co-ordination with other attendings and counselling patient  I have discussed care plan with patient and HCP,expressed understanding of problems treatment and their effect and side effects, alternatives in detail,I have asked if they have any questions and concerns and appropriately addressed them to best of my ability

## 2020-10-03 NOTE — PROGRESS NOTE ADULT - SUBJECTIVE AND OBJECTIVE BOX
[facility]       HPI:  49 yo female with  hx of cholecystectomy presents to er with cc c/o 3 days of abdominal pain. Started pain is located mostly  in epigastric area but and also is  spreading across upper abdomen and  to back. Some nausea, no vomiting. No fever.  No urinary c/o. Patient has a lap band for many years. Unsure if there is any fluid in it. Scheduled to have it removed Past med hx is significant for Blighted ovum  ,DM (diabetes mellitus)  ,Hypothyroid  ,Latex sensitivity  ,Missed   ,Obesity. ,H/O laparoscopic adjustable gastric banding  ,H/O:   ,History of cholecystectomy  ,S/P LASIK surgery. Found to have pancreatic enzymes elevation and diagnosed with acute pancreatitis .Admitted for pain management ,iv hydration and GI EVALUATION .Patient was initally presented to [place] ER and CT abdomen was done ,demonstrated MILD PERIPANCREATIC INFLAMMATORY CHANGES SURROUNDING THE PANCREATIC BODY AND TAIL REFLECTIVE OF MILD INTERSTITIAL PANCREATITIS .WELL-DEFINED CYSTIC FLUID COLLECTION WITHOUT RIM ENHANCEMENT IN THE PANCREATIC BODY MEASURING UP TO 2.8 X 2.4 CM WHICH MAY REPRESENT A PSEUDOCYST .Surg evaluation requested . (02 Oct 2020 07:35)        SUBJECTIVE:      ALLERGIES:  Allergies    latex (Other)  latex (Rash)  Novocain (Rash)    Intolerances          MEDICATIONS  (STANDING):  dextrose 5%. 1000 milliLiter(s) (50 mL/Hr) IV Continuous <Continuous>  dextrose 50% Injectable 12.5 Gram(s) IV Push once  dextrose 50% Injectable 25 Gram(s) IV Push once  dextrose 50% Injectable 25 Gram(s) IV Push once  enoxaparin Injectable 40 milliGRAM(s) SubCutaneous daily  insulin lispro (HumaLOG) corrective regimen sliding scale   SubCutaneous Before meals and at bedtime  levothyroxine Injectable 25 MICROGram(s) IV Push <User Schedule>  pantoprazole  Injectable 40 milliGRAM(s) IV Push daily  sodium chloride 0.9%. 1000 milliLiter(s) (100 mL/Hr) IV Continuous <Continuous>    MEDICATIONS  (PRN):  acetaminophen   Tablet .. 650 milliGRAM(s) Oral every 6 hours PRN Temp greater or equal to 38C (100.4F), Mild Pain (1 - 3)  dextrose 40% Gel 15 Gram(s) Oral once PRN Blood Glucose LESS THAN 70 milliGRAM(s)/deciliter  glucagon  Injectable 1 milliGRAM(s) IntraMuscular once PRN Glucose LESS THAN 70 milligrams/deciliter  morphine  - Injectable 2 milliGRAM(s) IV Push every 4 hours PRN Moderate Pain (4 - 6)  morphine  - Injectable 4 milliGRAM(s) IV Push every 6 hours PRN Severe Pain (7 - 10)  ondansetron Injectable 4 milliGRAM(s) IV Push every 6 hours PRN Nausea and/or Vomiting      REVIEW OF SYSTEMS:  CONSTITUTIONAL: No fever,  RESPIRATORY: No cough, wheezing, shortness of breath  CARDIOVASCULAR: No chest pain, dyspnea, palpitations, dizziness, syncope, paroxysmal nocturnal dyspnea, orthopnea, or arm or leg swelling  GASTROINTESTINAL: No abdominal  or epigastric pain, nausea, vomiting,  diarrhea  NEUROLOGICAL: No headaches,  loss of strength, numbness, or tremors    Vital Signs Last 24 Hrs  T(C): 37.2 (03 Oct 2020 21:14), Max: 37.3 (03 Oct 2020 12:45)  T(F): 99 (03 Oct 2020 21:14), Max: 99.1 (03 Oct 2020 12:45)  HR: 92 (03 Oct 2020 21:14) (87 - 105)  BP: 114/77 (03 Oct 2020 21:14) (111/79 - 116/76)  BP(mean): --  RR: 19 (03 Oct 2020 21:14) (17 - 20)  SpO2: 94% (03 Oct 2020 21:14) (94% - 95%)    PHYSICAL EXAM:  HEAD:  Atraumatic, Normocephalic  NECK: Supple and normal thyroid.  No JVD or carotid bruit.   HEART: S1, S2 regular , 1/6 soft ejection systolic murmur in mitral area , no thrill and no gallops .  PULMONARY: Bilateral vesicular breathing , few scattered ronchi and few scattered rales are present .  ABDOMEN: Soft nontender and positive bowl sounds   EXTREMITIES:  No clubbing, cyanosis, or pedal  edema  NEUROLOGICAL: AAOX3 , no focal deficit .    LABS:                        13.5   11.05 )-----------( 306      ( 03 Oct 2020 06:55 )             39.7     10    141  |  106  |  8   ----------------------------<  130<H>  3.7   |  29  |  0.83    Ca    8.7      03 Oct 2020 06:55  Phos  3.3     10-  Mg     2.2     10-    TPro  7.6  /  Alb  3.1<L>  /  TBili  0.4  /  DBili  x   /  AST  13<L>  /  ALT  34  /  AlkPhos  86  10-        PT/INR - ( 02 Oct 2020 11:42 )   PT: 13.9 sec;   INR: 1.20 ratio             BNP      EKG:  ECHO:  IMAGING:    Assessment/Plan    Will continue to follow during hospital course and give further recommendations as needed . Thanks for your referral . if any questions please contact me at office ([phone])cell [phone]) STEPHAN TADEO MD [place] [id number]  [address]  OFFICE : [phone]  CELL [phone]    CARDIOLOGY F/U:       HPI:  51 yo female with  hx of cholecystectomy presents to er with cc c/o 3 days of abdominal pain. Started pain is located mostly  in epigastric area but and also is  spreading across upper abdomen and  to back. Some nausea, no vomiting. No fever.  No urinary c/o. Patient has a lap band for many years. Unsure if there is any fluid in it. Scheduled to have it removed Past med hx is significant for Blighted ovum  ,DM (diabetes mellitus)  ,Hypothyroid  ,Latex sensitivity  ,Missed   ,Obesity. ,H/O laparoscopic adjustable gastric banding  ,H/O:   ,History of cholecystectomy  ,S/P LASIK surgery. Found to have pancreatic enzymes elevation and diagnosed with acute pancreatitis .Admitted for pain management ,iv hydration and GI EVALUATION .Patient was initally presented to [place] ER and CT abdomen was done ,demonstrated MILD PERIPANCREATIC INFLAMMATORY CHANGES SURROUNDING THE PANCREATIC BODY AND TAIL REFLECTIVE OF MILD INTERSTITIAL PANCREATITIS .WELL-DEFINED CYSTIC FLUID COLLECTION WITHOUT RIM ENHANCEMENT IN THE PANCREATIC BODY MEASURING UP TO 2.8 X 2.4 CM WHICH MAY REPRESENT A PSEUDOCYST .Surg evaluation requested . (02 Oct 2020 07:35)        SUBJECTIVE: Patient lying comfortable .      ALLERGIES:  Allergies    latex (Other)  latex (Rash)  Novocain (Rash)    Intolerances          MEDICATIONS  (STANDING):  dextrose 5%. 1000 milliLiter(s) (50 mL/Hr) IV Continuous <Continuous>  dextrose 50% Injectable 12.5 Gram(s) IV Push once  dextrose 50% Injectable 25 Gram(s) IV Push once  dextrose 50% Injectable 25 Gram(s) IV Push once  enoxaparin Injectable 40 milliGRAM(s) SubCutaneous daily  insulin lispro (HumaLOG) corrective regimen sliding scale   SubCutaneous Before meals and at bedtime  levothyroxine Injectable 25 MICROGram(s) IV Push <User Schedule>  pantoprazole  Injectable 40 milliGRAM(s) IV Push daily  sodium chloride 0.9%. 1000 milliLiter(s) (100 mL/Hr) IV Continuous <Continuous>    MEDICATIONS  (PRN):  acetaminophen   Tablet .. 650 milliGRAM(s) Oral every 6 hours PRN Temp greater or equal to 38C (100.4F), Mild Pain (1 - 3)  dextrose 40% Gel 15 Gram(s) Oral once PRN Blood Glucose LESS THAN 70 milliGRAM(s)/deciliter  glucagon  Injectable 1 milliGRAM(s) IntraMuscular once PRN Glucose LESS THAN 70 milligrams/deciliter  morphine  - Injectable 2 milliGRAM(s) IV Push every 4 hours PRN Moderate Pain (4 - 6)  morphine  - Injectable 4 milliGRAM(s) IV Push every 6 hours PRN Severe Pain (7 - 10)  ondansetron Injectable 4 milliGRAM(s) IV Push every 6 hours PRN Nausea and/or Vomiting      REVIEW OF SYSTEMS:  CONSTITUTIONAL: No fever,  RESPIRATORY: No cough, wheezing, shortness of breath  CARDIOVASCULAR: No chest pain, dyspnea, palpitations, dizziness, syncope, paroxysmal nocturnal dyspnea, orthopnea, or arm or leg swelling  GASTROINTESTINAL: Mild  abdominal  or epigastric pain and better but no  nausea, vomiting,  diarrhea  NEUROLOGICAL: No headaches,  loss of strength, numbness, or tremors  Skin : NO rashes .  Hematology : No bleeding .  Endocrinology : No heat and cold intolerance .  Psychiatry : Patient is calm .  Musculoskeletal : Patient has back ach .    Vital Signs Last 24 Hrs  T(C): 37.2 (03 Oct 2020 21:14), Max: 37.3 (03 Oct 2020 12:45)  T(F): 99 (03 Oct 2020 21:14), Max: 99.1 (03 Oct 2020 12:45)  HR: 92 (03 Oct 2020 21:14) (87 - 105)  BP: 114/77 (03 Oct 2020 21:14) (111/79 - 116/76)  BP(mean): --  RR: 19 (03 Oct 2020 21:14) (17 - 20)  SpO2: 94% (03 Oct 2020 21:14) (94% - 95%)    PHYSICAL EXAM:  HEAD:  Atraumatic, Normocephalic  NECK: Supple and normal thyroid.  No JVD or carotid bruit.   HEART: S1, S2 regular , 1/6 soft ejection systolic murmur in mitral area , no thrill and no gallops .  PULMONARY: Bilateral vesicular breathing , few scattered rhonchi and few scattered rales are present .  ABDOMEN: Mild epigastric tenderness  and positive bowl sounds   EXTREMITIES:  No clubbing, cyanosis, or pedal  edema  NEUROLOGICAL: AAOX3 , no focal deficit .  Skin : No rashes .  Musculoskeletal : No joint swellings .    LABS:                        13.5   11.05 )-----------( 306      ( 03 Oct 2020 06:55 )             39.7     10    141  |  106  |  8   ----------------------------<  130<H>  3.7   |  29  |  0.83    Ca    8.7      03 Oct 2020 06:55  Phos  3.3     10-  Mg     2.2     10-    TPro  7.6  /  Alb  3.1<L>  /  TBili  0.4  /  DBili  x   /  AST  13<L>  /  ALT  34  /  AlkPhos  86  10-        PT/INR - ( 02 Oct 2020 11:42 )   PT: 13.9 sec;   INR: 1.20 ratio           Assessment/Plan  Patient has :  1) Epigastric pain . Acute pancreatitis and better .  2) Pancreatic cyst .  3) Cardiac murmur mostly functional .  Plan : 1) Cardiac stable so far . 2) Continue I/V hydration 3) Monitor electrolytes .  Will continue to follow during hospital course and give further recommendations as needed . Thanks for your referral . if any questions please contact me at office ([phone])cell [phone])

## 2020-10-03 NOTE — PROGRESS NOTE ADULT - SUBJECTIVE AND OBJECTIVE BOX
pt seen  feeling better  some headache and back pain  ICU Vital Signs Last 24 Hrs  T(C): 37.1 (03 Oct 2020 05:05), Max: 37.1 (03 Oct 2020 05:05)  T(F): 98.7 (03 Oct 2020 05:05), Max: 98.7 (03 Oct 2020 05:05)  HR: 105 (03 Oct 2020 05:05) (89 - 105)  BP: 116/76 (03 Oct 2020 05:05) (113/50 - 120/84)  BP(mean): --  ABP: --  ABP(mean): --  RR: 17 (03 Oct 2020 05:05) (17 - 20)  SpO2: 95% (03 Oct 2020 05:05) (93% - 97%)  gen-NAD  resp-clear  abd-soft NT/ND                        13.5   11.05 )-----------( 306      ( 03 Oct 2020 06:55 )             39.7

## 2020-10-03 NOTE — PROGRESS NOTE ADULT - SUBJECTIVE AND OBJECTIVE BOX
INTERVAL HPI/OVERNIGHT EVENTS:    No new overnight event.  No N/V/D.  Tolerating diet clears some back pain    Allergies    latex (Other)  latex (Rash)  Novocain (Rash)    Intolerances    General:  No wt loss, fevers, chills, night sweats, fatigue,   Eyes:  Good vision, no reported pain  ENT:  No sore throat, pain, runny nose, dysphagia  CV:  No pain, palpitations, hypo/hypertension  Resp:  No dyspnea, cough, tachypnea, wheezing  GI:  No pain, No nausea, No vomiting, No diarrhea, No constipation, No weight loss, No fever, No pruritis, No rectal bleeding, No tarry stools, No dysphagia,  :  No pain, bleeding, incontinence, nocturia  Muscle:  No pain, weakness  Neuro:  No weakness, tingling, memory problems  Psych:  No fatigue, insomnia, mood problems, depression  Endocrine:  No polyuria, polydipsia, cold/heat intolerance  Heme:  No petechiae, ecchymosis, easy bruisability  Skin:  No rash, tattoos, scars, edema      PHYSICAL EXAM:   Vital Signs:  Vital Signs Last 24 Hrs  T(C): 37.3 (03 Oct 2020 12:45), Max: 37.3 (03 Oct 2020 12:45)  T(F): 99.1 (03 Oct 2020 12:45), Max: 99.1 (03 Oct 2020 12:45)  HR: 87 (03 Oct 2020 12:45) (87 - 105)  BP: 111/79 (03 Oct 2020 12:45) (111/79 - 120/84)  BP(mean): --  RR: 20 (03 Oct 2020 12:45) (17 - 20)  SpO2: 94% (03 Oct 2020 12:45) (93% - 95%)  Daily     Daily I&O's Summary    02 Oct 2020 07:01  -  03 Oct 2020 07:00  --------------------------------------------------------  IN: 3000 mL / OUT: 0 mL / NET: 3000 mL        GENERAL:  Appears stated age, well-groomed, well-nourished, no distress  HEENT:  NC/AT,  conjunctivae clear and pink, no thyromegaly, nodules, adenopathy, no JVD, sclera -anicteric  CHEST:  Full & symmetric excursion, no increased effort, breath sounds clear  HEART:  Regular rhythm, S1, S2, no murmur/rub/S3/S4, no abdominal bruit, no edema  ABDOMEN:  Soft, non-tender, non-distended, normoactive bowel sounds,  no masses ,no hepato-splenomegaly, no signs of chronic liver disease  EXTEREMITIES:  no cyanosis,clubbing or edema  SKIN:  No rash/erythema/ecchymoses/petechiae/wounds/abscess/warm/dry  NEURO:  Alert, oriented, no asterixis, no tremor, no encephalopathy      LABS:                        13.5   11.05 )-----------( 306      ( 03 Oct 2020 06:55 )             39.7     10-03    141  |  106  |  8   ----------------------------<  130<H>  3.7   |  29  |  0.83    Ca    8.7      03 Oct 2020 06:55  Phos  3.3     10-02  Mg     2.2     10-02    TPro  7.6  /  Alb  3.1<L>  /  TBili  0.4  /  DBili  x   /  AST  13<L>  /  ALT  34  /  AlkPhos  86  10-03    PT/INR - ( 02 Oct 2020 11:42 )   PT: 13.9 sec;   INR: 1.20 ratio           Urinalysis Basic - ( 01 Oct 2020 20:52 )    Color: Yellow / Appearance: Slightly Turbid / S.010 / pH: x  Gluc: x / Ketone: Negative  / Bili: Negative / Urobili: Negative mg/dL   Blood: x / Protein: Negative mg/dL / Nitrite: Negative   Leuk Esterase: Negative / RBC: 0-2 /HPF / WBC 0-2   Sq Epi: x / Non Sq Epi: Moderate / Bacteria: Negative      amylaseAmylase, Serum Total: 94 U/L (10-03 @ 06:55)  Amylase, Serum Total: 279 U/L (10-02 @ 06:09)     lipaseLipase, Serum: 429 U/L (10-03 @ 06:55)  Lipase, Serum: 2664 U/L (10-02 @ 06:09)  Lipase, Serum: 3737 U/L (10-01 @ 21:00)    RADIOLOGY & ADDITIONAL TESTS:

## 2020-10-04 LAB
AMYLASE P1 CFR SERPL: 69 U/L — SIGNIFICANT CHANGE UP (ref 25–125)
ANION GAP SERPL CALC-SCNC: 7 MMOL/L — SIGNIFICANT CHANGE UP (ref 5–17)
BUN SERPL-MCNC: 9 MG/DL — SIGNIFICANT CHANGE UP (ref 7–23)
CALCIUM SERPL-MCNC: 8.9 MG/DL — SIGNIFICANT CHANGE UP (ref 8.5–10.1)
CHLORIDE SERPL-SCNC: 109 MMOL/L — HIGH (ref 96–108)
CO2 SERPL-SCNC: 26 MMOL/L — SIGNIFICANT CHANGE UP (ref 22–31)
CREAT SERPL-MCNC: 0.69 MG/DL — SIGNIFICANT CHANGE UP (ref 0.5–1.3)
GLUCOSE SERPL-MCNC: 112 MG/DL — HIGH (ref 70–99)
HCT VFR BLD CALC: 36.1 % — SIGNIFICANT CHANGE UP (ref 34.5–45)
HGB BLD-MCNC: 12.5 G/DL — SIGNIFICANT CHANGE UP (ref 11.5–15.5)
LIDOCAIN IGE QN: 562 U/L — HIGH (ref 73–393)
MCHC RBC-ENTMCNC: 29.8 PG — SIGNIFICANT CHANGE UP (ref 27–34)
MCHC RBC-ENTMCNC: 34.6 GM/DL — SIGNIFICANT CHANGE UP (ref 32–36)
MCV RBC AUTO: 86 FL — SIGNIFICANT CHANGE UP (ref 80–100)
NRBC # BLD: 0 /100 WBCS — SIGNIFICANT CHANGE UP (ref 0–0)
PLATELET # BLD AUTO: 279 K/UL — SIGNIFICANT CHANGE UP (ref 150–400)
POTASSIUM SERPL-MCNC: 3.7 MMOL/L — SIGNIFICANT CHANGE UP (ref 3.5–5.3)
POTASSIUM SERPL-SCNC: 3.7 MMOL/L — SIGNIFICANT CHANGE UP (ref 3.5–5.3)
RBC # BLD: 4.2 M/UL — SIGNIFICANT CHANGE UP (ref 3.8–5.2)
RBC # FLD: 13 % — SIGNIFICANT CHANGE UP (ref 10.3–14.5)
SODIUM SERPL-SCNC: 142 MMOL/L — SIGNIFICANT CHANGE UP (ref 135–145)
TRIGL SERPL-MCNC: 100 MG/DL — SIGNIFICANT CHANGE UP (ref 10–149)
WBC # BLD: 7.37 K/UL — SIGNIFICANT CHANGE UP (ref 3.8–10.5)
WBC # FLD AUTO: 7.37 K/UL — SIGNIFICANT CHANGE UP (ref 3.8–10.5)

## 2020-10-04 RX ORDER — LANOLIN ALCOHOL/MO/W.PET/CERES
5 CREAM (GRAM) TOPICAL AT BEDTIME
Refills: 0 | Status: DISCONTINUED | OUTPATIENT
Start: 2020-10-04 | End: 2020-10-07

## 2020-10-04 RX ORDER — SENNA PLUS 8.6 MG/1
2 TABLET ORAL AT BEDTIME
Refills: 0 | Status: DISCONTINUED | OUTPATIENT
Start: 2020-10-04 | End: 2020-10-07

## 2020-10-04 RX ORDER — POLYETHYLENE GLYCOL 3350 17 G/17G
17 POWDER, FOR SOLUTION ORAL DAILY
Refills: 0 | Status: DISCONTINUED | OUTPATIENT
Start: 2020-10-04 | End: 2020-10-07

## 2020-10-04 RX ORDER — LEVOTHYROXINE SODIUM 125 MCG
50 TABLET ORAL DAILY
Refills: 0 | Status: DISCONTINUED | OUTPATIENT
Start: 2020-10-04 | End: 2020-10-07

## 2020-10-04 RX ORDER — PANTOPRAZOLE SODIUM 20 MG/1
40 TABLET, DELAYED RELEASE ORAL
Refills: 0 | Status: DISCONTINUED | OUTPATIENT
Start: 2020-10-04 | End: 2020-10-07

## 2020-10-04 RX ADMIN — MORPHINE SULFATE 2 MILLIGRAM(S): 50 CAPSULE, EXTENDED RELEASE ORAL at 22:56

## 2020-10-04 RX ADMIN — Medication 30 MILLILITER(S): at 20:01

## 2020-10-04 RX ADMIN — MORPHINE SULFATE 2 MILLIGRAM(S): 50 CAPSULE, EXTENDED RELEASE ORAL at 00:10

## 2020-10-04 RX ADMIN — Medication 50 MICROGRAM(S): at 06:38

## 2020-10-04 RX ADMIN — ENOXAPARIN SODIUM 40 MILLIGRAM(S): 100 INJECTION SUBCUTANEOUS at 14:03

## 2020-10-04 RX ADMIN — SODIUM CHLORIDE 100 MILLILITER(S): 9 INJECTION INTRAMUSCULAR; INTRAVENOUS; SUBCUTANEOUS at 06:38

## 2020-10-04 RX ADMIN — PANTOPRAZOLE SODIUM 40 MILLIGRAM(S): 20 TABLET, DELAYED RELEASE ORAL at 14:03

## 2020-10-04 RX ADMIN — Medication 5 MILLIGRAM(S): at 21:26

## 2020-10-04 RX ADMIN — MORPHINE SULFATE 2 MILLIGRAM(S): 50 CAPSULE, EXTENDED RELEASE ORAL at 21:26

## 2020-10-04 NOTE — PROGRESS NOTE ADULT - SUBJECTIVE AND OBJECTIVE BOX
PT seen  feeling better  some discomfort with eating  ICU Vital Signs Last 24 Hrs  T(C): 36.7 (04 Oct 2020 04:59), Max: 37.3 (03 Oct 2020 12:45)  T(F): 98.1 (04 Oct 2020 04:59), Max: 99.1 (03 Oct 2020 12:45)  HR: 82 (04 Oct 2020 04:59) (82 - 92)  BP: 107/72 (04 Oct 2020 04:59) (107/72 - 114/77)  BP(mean): --  ABP: --  ABP(mean): --  RR: 18 (04 Oct 2020 04:59) (18 - 20)  SpO2: 92% (04 Oct 2020 04:59) (92% - 94%)  gen-NAD  resp-clear  abd-soft, minimal tenderness on deep palpation                          12.5   7.37  )-----------( 279      ( 04 Oct 2020 09:06 )             36.1

## 2020-10-04 NOTE — PROGRESS NOTE ADULT - SUBJECTIVE AND OBJECTIVE BOX
[facility] [place]       HPI:  49 yo female with  hx of cholecystectomy presents to er with cc c/o 3 days of abdominal pain. Started pain is located mostly  in epigastric area but and also is  spreading across upper abdomen and  to back. Some nausea, no vomiting. No fever.  No urinary c/o. Patient has a lap band for many years. Unsure if there is any fluid in it. Scheduled to have it removed Past med hx is significant for Blighted ovum  ,DM (diabetes mellitus)  ,Hypothyroid  ,Latex sensitivity  ,Missed   ,Obesity. ,H/O laparoscopic adjustable gastric banding  ,H/O:   ,History of cholecystectomy  ,S/P LASIK surgery. Found to have pancreatic enzymes elevation and diagnosed with acute pancreatitis .Admitted for pain management ,iv hydration and GI EVALUATION .Patient was initally presented to [facility] ER and CT abdomen was done ,demonstrated MILD PERIPANCREATIC INFLAMMATORY CHANGES SURROUNDING THE PANCREATIC BODY AND TAIL REFLECTIVE OF MILD INTERSTITIAL PANCREATITIS .WELL-DEFINED CYSTIC FLUID COLLECTION WITHOUT RIM ENHANCEMENT IN THE PANCREATIC BODY MEASURING UP TO 2.8 X 2.4 CM WHICH MAY REPRESENT A PSEUDOCYST .Surg evaluation requested . (02 Oct 2020 07:35)        SUBJECTIVE:      ALLERGIES:  Allergies    latex (Other)  latex (Rash)  Novocain (Rash)    Intolerances          MEDICATIONS  (STANDING):  dextrose 5%. 1000 milliLiter(s) (50 mL/Hr) IV Continuous <Continuous>  dextrose 50% Injectable 12.5 Gram(s) IV Push once  dextrose 50% Injectable 25 Gram(s) IV Push once  dextrose 50% Injectable 25 Gram(s) IV Push once  enoxaparin Injectable 40 milliGRAM(s) SubCutaneous daily  insulin lispro (HumaLOG) corrective regimen sliding scale   SubCutaneous Before meals and at bedtime  levothyroxine 50 MICROGram(s) Oral daily  pantoprazole    Tablet 40 milliGRAM(s) Oral before breakfast    MEDICATIONS  (PRN):  acetaminophen   Tablet .. 650 milliGRAM(s) Oral every 6 hours PRN Temp greater or equal to 38C (100.4F), Mild Pain (1 - 3)  dextrose 40% Gel 15 Gram(s) Oral once PRN Blood Glucose LESS THAN 70 milliGRAM(s)/deciliter  glucagon  Injectable 1 milliGRAM(s) IntraMuscular once PRN Glucose LESS THAN 70 milligrams/deciliter  morphine  - Injectable 2 milliGRAM(s) IV Push every 4 hours PRN Moderate Pain (4 - 6)  morphine  - Injectable 4 milliGRAM(s) IV Push every 6 hours PRN Severe Pain (7 - 10)  ondansetron Injectable 4 milliGRAM(s) IV Push every 6 hours PRN Nausea and/or Vomiting      REVIEW OF SYSTEMS:  CONSTITUTIONAL: No fever,  RESPIRATORY: No cough, wheezing, shortness of breath  CARDIOVASCULAR: No chest pain, dyspnea, palpitations, dizziness, syncope, paroxysmal nocturnal dyspnea, orthopnea, or arm or leg swelling  GASTROINTESTINAL: No abdominal  or epigastric pain, nausea, vomiting,  diarrhea  NEUROLOGICAL: No headaches,  loss of strength, numbness, or tremors    Vital Signs Last 24 Hrs  T(C): 37.1 (04 Oct 2020 14:13), Max: 37.2 (03 Oct 2020 21:14)  T(F): 98.8 (04 Oct 2020 14:13), Max: 99 (03 Oct 2020 21:14)  HR: 80 (04 Oct 2020 14:13) (80 - 92)  BP: 138/81 (04 Oct 2020 14:13) (107/72 - 138/81)  BP(mean): --  RR: 18 (04 Oct 2020 14:13) (18 - 19)  SpO2: 93% (04 Oct 2020 14:13) (92% - 94%)    PHYSICAL EXAM:  HEAD:  Atraumatic, Normocephalic  NECK: Supple and normal thyroid.  No JVD or carotid bruit.   HEART: S1, S2 regular , 1/6 soft ejection systolic murmur in mitral area , no thrill and no gallops .  PULMONARY: Bilateral vesicular breathing , few scattered ronchi and few scattered rales are present .  ABDOMEN: Soft nontender and positive bowl sounds   EXTREMITIES:  No clubbing, cyanosis, or pedal  edema  NEUROLOGICAL: AAOX3 , no focal deficit .    LABS:                        12.5   7.37  )-----------( 279      ( 04 Oct 2020 09:06 )             36.1     10-04    142  |  109<H>  |  9   ----------------------------<  112<H>  3.7   |  26  |  0.69    Ca    8.9      04 Oct 2020 09:06    TPro  7.6  /  Alb  3.1<L>  /  TBili  0.4  /  DBili  x   /  AST  13<L>  /  ALT  34  /  AlkPhos  86  10-03            BNP      EKG:  ECHO:  IMAGING:    Assessment/Plan    Will continue to follow during hospital course and give further recommendations as needed . Thanks for your referral . if any questions please contact me at office ([phone])cell [phone]) STEPHAN TADEO MD [place] [address]  [address]  OFFICE ; [phone]  CELL : [phone]    CARDIOLOGY F/U :       HPI:  49 yo female with  hx of cholecystectomy presents to er with cc c/o 3 days of abdominal pain. Started pain is located mostly  in epigastric area but and also is  spreading across upper abdomen and  to back. Some nausea, no vomiting. No fever.  No urinary c/o. Patient has a lap band for many years. Unsure if there is any fluid in it. Scheduled to have it removed Past med hx is significant for Blighted ovum  ,DM (diabetes mellitus)  ,Hypothyroid  ,Latex sensitivity  ,Missed   ,Obesity. ,H/O laparoscopic adjustable gastric banding  ,H/O:   ,History of cholecystectomy  ,S/P LASIK surgery. Found to have pancreatic enzymes elevation and diagnosed with acute pancreatitis .Admitted for pain management ,iv hydration and GI EVALUATION .Patient was initally presented to [place] ER and CT abdomen was done ,demonstrated MILD PERIPANCREATIC INFLAMMATORY CHANGES SURROUNDING THE PANCREATIC BODY AND TAIL REFLECTIVE OF MILD INTERSTITIAL PANCREATITIS .WELL-DEFINED CYSTIC FLUID COLLECTION WITHOUT RIM ENHANCEMENT IN THE PANCREATIC BODY MEASURING UP TO 2.8 X 2.4 CM WHICH MAY REPRESENT A PSEUDOCYST .Surg evaluation requested . (02 Oct 2020 07:35)        SUBJECTIVE: Patient lying comfortable and feeling better .      ALLERGIES:  Allergies    latex (Other)  latex (Rash)  Novocain (Rash)    Intolerances          MEDICATIONS  (STANDING):  dextrose 5%. 1000 milliLiter(s) (50 mL/Hr) IV Continuous <Continuous>  dextrose 50% Injectable 12.5 Gram(s) IV Push once  dextrose 50% Injectable 25 Gram(s) IV Push once  dextrose 50% Injectable 25 Gram(s) IV Push once  enoxaparin Injectable 40 milliGRAM(s) SubCutaneous daily  insulin lispro (HumaLOG) corrective regimen sliding scale   SubCutaneous Before meals and at bedtime  levothyroxine 50 MICROGram(s) Oral daily  pantoprazole    Tablet 40 milliGRAM(s) Oral before breakfast    MEDICATIONS  (PRN):  acetaminophen   Tablet .. 650 milliGRAM(s) Oral every 6 hours PRN Temp greater or equal to 38C (100.4F), Mild Pain (1 - 3)  dextrose 40% Gel 15 Gram(s) Oral once PRN Blood Glucose LESS THAN 70 milliGRAM(s)/deciliter  glucagon  Injectable 1 milliGRAM(s) IntraMuscular once PRN Glucose LESS THAN 70 milligrams/deciliter  morphine  - Injectable 2 milliGRAM(s) IV Push every 4 hours PRN Moderate Pain (4 - 6)  morphine  - Injectable 4 milliGRAM(s) IV Push every 6 hours PRN Severe Pain (7 - 10)  ondansetron Injectable 4 milliGRAM(s) IV Push every 6 hours PRN Nausea and/or Vomiting      REVIEW OF SYSTEMS:  CONSTITUTIONAL: No fever,  RESPIRATORY: No cough, wheezing, shortness of breath  CARDIOVASCULAR: No chest pain, dyspnea, palpitations, dizziness, syncope, paroxysmal nocturnal dyspnea, orthopnea, or arm or leg swelling  GASTROINTESTINAL: No abdominal  or epigastric pain, nausea, vomiting,  diarrhea  NEUROLOGICAL: No headaches,  loss of strength, numbness, or tremors  Skin : No itching .  Hematology :NO bleeding  Endocrinology : No heat and cold intolerance .  Psychiatry : Patient is calm .  Musculoskeletal : Patient has mild  back pain .      Vital Signs Last 24 Hrs  T(C): 37.1 (04 Oct 2020 14:13), Max: 37.2 (03 Oct 2020 21:14)  T(F): 98.8 (04 Oct 2020 14:13), Max: 99 (03 Oct 2020 21:14)  HR: 80 (04 Oct 2020 14:13) (80 - 92)  BP: 138/81 (04 Oct 2020 14:13) (107/72 - 138/81)  BP(mean): --  RR: 18 (04 Oct 2020 14:13) (18 - 19)  SpO2: 93% (04 Oct 2020 14:13) (92% - 94%)    PHYSICAL EXAM:  HEAD:  Atraumatic, Normocephalic  NECK: Supple and normal thyroid.  No JVD or carotid bruit.   HEART: S1, S2 regular , 1/6 soft ejection systolic murmur in mitral area , no thrill and no gallops .  PULMONARY: Bilateral vesicular breathing , few scattered rhonchi and few scattered rales are present .  ABDOMEN: Soft  and positive bowl sounds and minimal abdominal pain still present .  EXTREMITIES:  No clubbing, cyanosis, or pedal  edema  NEUROLOGICAL: AAOX3 , no focal deficit .  Skin : No rashes .  Musculoskeletal : No joint swellings .  LABS:                        12.5   7.37  )-----------( 279      ( 04 Oct 2020 09:06 )             36.1     10-04    142  |  109<H>  |  9   ----------------------------<  112<H>  3.7   |  26  |  0.69    Ca    8.9      04 Oct 2020 09:06    TPro  7.6  /  Alb  3.1<L>  /  TBili  0.4  /  DBili  x   /  AST  13<L>  /  ALT  34  /  AlkPhos  86  1003            Assessment/Plan  Patient has :  1) Epigastric pain . Acute pancreatitis and better .  2) Pancreatic cyst .  3) Mild MR and Mild TR on echo .  Plan : 1) Cardiac stable so far . 2) Continue I/V hydration 3) Monitor electrolytes .  Will continue to follow during hospital course and give further recommendations as needed . Thanks for your referral . if any questions please contact me at office ([phone])cell [phone])

## 2020-10-04 NOTE — PROGRESS NOTE ADULT - SUBJECTIVE AND OBJECTIVE BOX
INTERVAL HPI/OVERNIGHT EVENTS:  No new overnight event.  No N/V/D.  Tolerating diet.  still with pain    Allergies    latex (Other)  latex (Rash)  Novocain (Rash)    Intolerances    General:  No wt loss, fevers, chills, night sweats, fatigue,   Eyes:  Good vision, no reported pain  ENT:  No sore throat, pain, runny nose, dysphagia  CV:  No pain, palpitations, hypo/hypertension  Resp:  No dyspnea, cough, tachypnea, wheezing  GI:  No pain, No nausea, No vomiting, No diarrhea, No constipation, No weight loss, No fever, No pruritis, No rectal bleeding, No tarry stools, No dysphagia,  :  No pain, bleeding, incontinence, nocturia  Muscle:  No pain, weakness  Neuro:  No weakness, tingling, memory problems  Psych:  No fatigue, insomnia, mood problems, depression  Endocrine:  No polyuria, polydipsia, cold/heat intolerance  Heme:  No petechiae, ecchymosis, easy bruisability  Skin:  No rash, tattoos, scars, edema      PHYSICAL EXAM:   Vital Signs:  Vital Signs Last 24 Hrs  T(C): 36.7 (04 Oct 2020 04:59), Max: 37.2 (03 Oct 2020 21:14)  T(F): 98.1 (04 Oct 2020 04:59), Max: 99 (03 Oct 2020 21:14)  HR: 82 (04 Oct 2020 04:59) (82 - 92)  BP: 107/72 (04 Oct 2020 04:59) (107/72 - 114/77)  BP(mean): --  RR: 18 (04 Oct 2020 04:59) (18 - 19)  SpO2: 92% (04 Oct 2020 04:59) (92% - 94%)  Daily     Daily I&O's Summary    03 Oct 2020 07:01  -  04 Oct 2020 07:00  --------------------------------------------------------  IN: 1200 mL / OUT: 0 mL / NET: 1200 mL        GENERAL:  Appears stated age, well-groomed, well-nourished, no distress  HEENT:  NC/AT,  conjunctivae clear and pink, no thyromegaly, nodules, adenopathy, no JVD, sclera -anicteric  CHEST:  Full & symmetric excursion, no increased effort, breath sounds clear  HEART:  Regular rhythm, S1, S2, no murmur/rub/S3/S4, no abdominal bruit, no edema  ABDOMEN:  Soft, non-tender, non-distended, normoactive bowel sounds,  no masses ,no hepato-splenomegaly, no signs of chronic liver disease  EXTEREMITIES:  no cyanosis,clubbing or edema  SKIN:  No rash/erythema/ecchymoses/petechiae/wounds/abscess/warm/dry  NEURO:  Alert, oriented, no asterixis, no tremor, no encephalopathy      LABS:                        12.5   7.37  )-----------( 279      ( 04 Oct 2020 09:06 )             36.1     10-04    142  |  109<H>  |  9   ----------------------------<  112<H>  3.7   |  26  |  0.69    Ca    8.9      04 Oct 2020 09:06    TPro  7.6  /  Alb  3.1<L>  /  TBili  0.4  /  DBili  x   /  AST  13<L>  /  ALT  34  /  AlkPhos  86  10-03        amylaseAmylase, Serum Total: 69 U/L (10-04 @ 09:06)  Amylase, Serum Total: 94 U/L (10-03 @ 06:55)  Amylase, Serum Total: 279 U/L (10-02 @ 06:09)     lipaseLipase, Serum: 562 U/L (10-04 @ 09:06)  Lipase, Serum: 429 U/L (10-03 @ 06:55)  Lipase, Serum: 2664 U/L (10-02 @ 06:09)  Lipase, Serum: 3737 U/L (10-01 @ 21:00)    RADIOLOGY & ADDITIONAL TESTS:

## 2020-10-04 NOTE — PROGRESS NOTE ADULT - SUBJECTIVE AND OBJECTIVE BOX
PROGRESS NOTE  Patient is a 50y old  Female who presents with a chief complaint of abdominal pain (03 Oct 2020 22:35)  LATE ENTRY- patient was seen and examined earlier today . Plan of care was discussed with med staff and unit coordinator .   Chart and available morning labs /imaging are reviewed electronically , urgent issues addressed . More information  is being added upon completion of rounds , when more information is collected and management discussed with consultants , medical staff and social service/case management on the floor   OVERNIGHT  Feels better ,still has epigastric discomfort and radiation to back ,but requires less pain medicine Some nausea and abd fullness  after meals On clear liquids .No fever Requesting to shower Followed by gi and sx team   HPI:  51 yo female with  hx of cholecystectomy presents to er with cc c/o 3 days of abdominal pain. Started pain is located mostly  in epigastric area but and also is  spreading across upper abdomen and  to back. Some nausea, no vomiting. No fever.  No urinary c/o. Patient has a lap band for many years. Unsure if there is any fluid in it. Scheduled to have it removed Past med hx is significant for Blighted ovum  ,DM (diabetes mellitus)  ,Hypothyroid  ,Latex sensitivity  ,Missed   ,Obesity. ,H/O laparoscopic adjustable gastric banding  ,H/O:   ,History of cholecystectomy  ,S/P LASIK surgery. Found to have pancreatic enzymes elevation and diagnosed with acute pancreatitis .Admitted for pain management ,iv hydration and GI EVALUATION .Patient was initally presented to [place] ER and CT abdomen was done ,demonstrated MILD PERIPANCREATIC INFLAMMATORY CHANGES SURROUNDING THE PANCREATIC BODY AND TAIL REFLECTIVE OF MILD INTERSTITIAL PANCREATITIS .WELL-DEFINED CYSTIC FLUID COLLECTION WITHOUT RIM ENHANCEMENT IN THE PANCREATIC BODY MEASURING UP TO 2.8 X 2.4 CM WHICH MAY REPRESENT A PSEUDOCYST .Surg evaluation requested . (02 Oct 2020 07:35)    PAST MEDICAL & SURGICAL HISTORY:  Obesity    Missed     Latex sensitivity    DM (diabetes mellitus)    Blighted ovum    Hypothyroid    History of hypothyroidism    Morbid obesity due to excess calories  BMI 38.1    Latex Sensitivity    Blighted Ovum    Obesity    Missed     Diabetes Mellitus Type II    H/O laparoscopic adjustable gastric banding    S/P LASIK surgery    History of cholecystectomy    H/O:     History of cervical spinal surgery  fusion, anterior approach    Status Post Gastric Banding      Status Post Eye Surgery  lasik    S/P  Section  x 2    S/P Cholecystectomy        MEDICATIONS  (STANDING):  dextrose 5%. 1000 milliLiter(s) (50 mL/Hr) IV Continuous <Continuous>  dextrose 50% Injectable 12.5 Gram(s) IV Push once  dextrose 50% Injectable 25 Gram(s) IV Push once  dextrose 50% Injectable 25 Gram(s) IV Push once  enoxaparin Injectable 40 milliGRAM(s) SubCutaneous daily  insulin lispro (HumaLOG) corrective regimen sliding scale   SubCutaneous Before meals and at bedtime  levothyroxine 50 MICROGram(s) Oral daily  pantoprazole  Injectable 40 milliGRAM(s) IV Push daily  sodium chloride 0.9%. 1000 milliLiter(s) (100 mL/Hr) IV Continuous <Continuous>    MEDICATIONS  (PRN):  acetaminophen   Tablet .. 650 milliGRAM(s) Oral every 6 hours PRN Temp greater or equal to 38C (100.4F), Mild Pain (1 - 3)  dextrose 40% Gel 15 Gram(s) Oral once PRN Blood Glucose LESS THAN 70 milliGRAM(s)/deciliter  glucagon  Injectable 1 milliGRAM(s) IntraMuscular once PRN Glucose LESS THAN 70 milligrams/deciliter  morphine  - Injectable 2 milliGRAM(s) IV Push every 4 hours PRN Moderate Pain (4 - 6)  morphine  - Injectable 4 milliGRAM(s) IV Push every 6 hours PRN Severe Pain (7 - 10)  ondansetron Injectable 4 milliGRAM(s) IV Push every 6 hours PRN Nausea and/or Vomiting      OBJECTIVE    T(C): 36.7 (10-04-20 @ 04:59), Max: 37.3 (10-03-20 @ 12:45)  HR: 82 (10-04-20 @ 04:59) (82 - 92)  BP: 107/72 (10-04-20 @ 04:59) (107/72 - 114/77)  RR: 18 (10-04-20 @ 04:59) (18 - 20)  SpO2: 92% (10-04-20 @ 04:59) (92% - 94%)  Wt(kg): --  I&O's Summary    03 Oct 2020 07:01  -  04 Oct 2020 07:00  --------------------------------------------------------  IN: 1200 mL / OUT: 0 mL / NET: 1200 mL          REVIEW OF SYSTEMS:  CONSTITUTIONAL: No fever, weight loss, or fatigue  EYES: No eye pain, visual disturbances, or discharge  ENMT:   No sinus or throat pain  NECK: No pain or stiffness  RESPIRATORY: No cough, wheezing, chills or hemoptysis; No shortness of breath  CARDIOVASCULAR: No chest pain, palpitations, dizziness, or leg swelling  GASTROINTESTINAL: No abdominal pain. No nausea, vomiting; No diarrhea or constipation. No melena or hematochezia.  GENITOURINARY: No dysuria, frequency, hematuria, or incontinence  NEUROLOGICAL: No headaches, memory loss, loss of strength, numbness, or tremors  SKIN: No itching, burning, rashes, or lesions   MUSCULOSKELETAL: No joint pain or swelling; No muscle, back, or extremity pain    PHYSICAL EXAM:  Appearance: NAD. VS past 24 hrs -as above   HEENT:   Moist oral mucosa. Conjunctiva clear b/l.   Neck : supple  Respiratory: Lungs CTAB.  Gastrointestinal:  Soft, nontender. No rebound. No rigidity. BS present	  Cardiovascular: RRR ,S1S2 present  Neurologic: Non-focal. Moving all extremities.  Extremities: No edema. No erythema. No calf tenderness.  Skin: No rashes, No ecchymoses, No cyanosis.	  wounds ,skin lesions-See skin assesment flow sheet   LABS:                        12.5   7.37  )-----------( 279      ( 04 Oct 2020 09:06 )             36.1     10-04    142  |  109<H>  |  9   ----------------------------<  112<H>  3.7   |  26  |  0.69  Ca    8.9      04 Oct 2020 09:06  TPro  7.6  /  Alb  3.1<L>  /  TBili  0.4  /  DBili  x   /  AST  13<L>  /  ALT  34  /  AlkPhos  86  10-03  CAPILLARY BLOOD GLUCOSE  POCT Blood Glucose.: 96 mg/dL (04 Oct 2020 11:39)  POCT Blood Glucose.: 114 mg/dL (04 Oct 2020 07:53)  POCT Blood Glucose.: 94 mg/dL (03 Oct 2020 22:03)  POCT Blood Glucose.: 128 mg/dL (03 Oct 2020 18:01)  Culture - Blood (collected 02 Oct 2020 14:52)  Source: .Blood Blood  Preliminary Report (03 Oct 2020 15:04):    No growth to date.  RADIOLOGY & ADDITIONAL TESTS:   reviewed elctronically  ASSESSMENT/PLAN: 	  45minutes spent on this visit, 50% visit time spent in care co-ordination with other attendings and counselling patient  I have discussed care plan with patient and HCP,expressed understanding of problems treatment and their effect and side effects, alternatives in detail,I have asked if they have any questions and concerns and appropriately addressed them to best of my ability

## 2020-10-05 LAB
AMYLASE P1 CFR SERPL: 62 U/L — SIGNIFICANT CHANGE UP (ref 25–125)
ANION GAP SERPL CALC-SCNC: 6 MMOL/L — SIGNIFICANT CHANGE UP (ref 5–17)
BUN SERPL-MCNC: 10 MG/DL — SIGNIFICANT CHANGE UP (ref 7–23)
CALCIUM SERPL-MCNC: 9 MG/DL — SIGNIFICANT CHANGE UP (ref 8.5–10.1)
CHLORIDE SERPL-SCNC: 106 MMOL/L — SIGNIFICANT CHANGE UP (ref 96–108)
CO2 SERPL-SCNC: 30 MMOL/L — SIGNIFICANT CHANGE UP (ref 22–31)
CREAT SERPL-MCNC: 0.77 MG/DL — SIGNIFICANT CHANGE UP (ref 0.5–1.3)
GLUCOSE SERPL-MCNC: 105 MG/DL — HIGH (ref 70–99)
HCT VFR BLD CALC: 37.1 % — SIGNIFICANT CHANGE UP (ref 34.5–45)
HGB BLD-MCNC: 12.9 G/DL — SIGNIFICANT CHANGE UP (ref 11.5–15.5)
LIDOCAIN IGE QN: 549 U/L — HIGH (ref 73–393)
MCHC RBC-ENTMCNC: 29.4 PG — SIGNIFICANT CHANGE UP (ref 27–34)
MCHC RBC-ENTMCNC: 34.8 GM/DL — SIGNIFICANT CHANGE UP (ref 32–36)
MCV RBC AUTO: 84.5 FL — SIGNIFICANT CHANGE UP (ref 80–100)
NRBC # BLD: 0 /100 WBCS — SIGNIFICANT CHANGE UP (ref 0–0)
PLATELET # BLD AUTO: 312 K/UL — SIGNIFICANT CHANGE UP (ref 150–400)
POTASSIUM SERPL-MCNC: 3.6 MMOL/L — SIGNIFICANT CHANGE UP (ref 3.5–5.3)
POTASSIUM SERPL-SCNC: 3.6 MMOL/L — SIGNIFICANT CHANGE UP (ref 3.5–5.3)
RBC # BLD: 4.39 M/UL — SIGNIFICANT CHANGE UP (ref 3.8–5.2)
RBC # FLD: 12.5 % — SIGNIFICANT CHANGE UP (ref 10.3–14.5)
SODIUM SERPL-SCNC: 142 MMOL/L — SIGNIFICANT CHANGE UP (ref 135–145)
WBC # BLD: 7.38 K/UL — SIGNIFICANT CHANGE UP (ref 3.8–10.5)
WBC # FLD AUTO: 7.38 K/UL — SIGNIFICANT CHANGE UP (ref 3.8–10.5)

## 2020-10-05 RX ORDER — SODIUM CHLORIDE 9 MG/ML
1000 INJECTION INTRAMUSCULAR; INTRAVENOUS; SUBCUTANEOUS
Refills: 0 | Status: DISCONTINUED | OUTPATIENT
Start: 2020-10-05 | End: 2020-10-06

## 2020-10-05 RX ADMIN — POLYETHYLENE GLYCOL 3350 17 GRAM(S): 17 POWDER, FOR SOLUTION ORAL at 06:14

## 2020-10-05 RX ADMIN — ENOXAPARIN SODIUM 40 MILLIGRAM(S): 100 INJECTION SUBCUTANEOUS at 11:57

## 2020-10-05 RX ADMIN — MORPHINE SULFATE 2 MILLIGRAM(S): 50 CAPSULE, EXTENDED RELEASE ORAL at 22:37

## 2020-10-05 RX ADMIN — SENNA PLUS 2 TABLET(S): 8.6 TABLET ORAL at 21:15

## 2020-10-05 RX ADMIN — PANTOPRAZOLE SODIUM 40 MILLIGRAM(S): 20 TABLET, DELAYED RELEASE ORAL at 06:15

## 2020-10-05 RX ADMIN — SENNA PLUS 2 TABLET(S): 8.6 TABLET ORAL at 06:15

## 2020-10-05 RX ADMIN — SODIUM CHLORIDE 75 MILLILITER(S): 9 INJECTION INTRAMUSCULAR; INTRAVENOUS; SUBCUTANEOUS at 17:25

## 2020-10-05 RX ADMIN — Medication 5 MILLIGRAM(S): at 21:15

## 2020-10-05 RX ADMIN — MORPHINE SULFATE 2 MILLIGRAM(S): 50 CAPSULE, EXTENDED RELEASE ORAL at 22:07

## 2020-10-05 RX ADMIN — Medication 50 MICROGRAM(S): at 06:15

## 2020-10-05 NOTE — DIETITIAN INITIAL EVALUATION ADULT. - OTHER INFO
Pt A+Ox4. Dx acute pancreatitis, unknown cause- gallstones vs sludge vs idiopathic per GI consult. Clear liquid diet rx and tolerated well. Improved abdominal pain. No report N/V. +constipation. Last BM 10/1. Bowel regimen added. Pta follows higher protein, heart healthy, low fat diet. Follows nutritionist outpatient as pt has struggled with weight for years. Believes her thyroid issues affecting weight. Stated current weight 194lbs. Stable. Exercises daily, walks 34443 steps/day. Slightly elevated HgbA1c 6.6%. No hx DM however family hx. Lipid profile Chol 193, HDL 37, , . Discussed lab values with patient. Recommend advancing diet as tolerated to Dash/TLC, Consistent Carbohydrate with emphasis on HBV protein sources. Pt declined written/verbal education. Will remain available prn. Pt A+Ox4. Dx acute pancreatitis, unknown cause- gallstones vs sludge vs idiopathic per GI consult. Clear liquid diet rx and tolerated well. Improved abdominal pain. No report N/V. +constipation. Last BM 10/1. Bowel regimen added. Pta follows higher protein, heart healthy, low fat diet. Follows nutritionist outpatient as pt has struggled with weight for years. Believes her thyroid issues affecting weight; hx hypothyroid. Stated current weight 194lbs. Stable. Exercises daily, walks 68780 steps/day. Slightly elevated HgbA1c 6.6%. Per pt no hx DM however family hx. Lipid profile Chol 193, HDL 37, , . Discussed lab values with patient. Recommend advancing diet as tolerated to Dash/TLC, low fat, consistent carbohydrate with emphasis on HBV protein sources. Pt declined written/verbal education. Will remain available prn.

## 2020-10-05 NOTE — PROGRESS NOTE ADULT - SUBJECTIVE AND OBJECTIVE BOX
[facility]       HPI:  49 yo female with  hx of cholecystectomy presents to er with cc c/o 3 days of abdominal pain. Started pain is located mostly  in epigastric area but and also is  spreading across upper abdomen and  to back. Some nausea, no vomiting. No fever.  No urinary c/o. Patient has a lap band for many years. Unsure if there is any fluid in it. Scheduled to have it removed Past med hx is significant for Blighted ovum  ,DM (diabetes mellitus)  ,Hypothyroid  ,Latex sensitivity  ,Missed   ,Obesity. ,H/O laparoscopic adjustable gastric banding  ,H/O:   ,History of cholecystectomy  ,S/P LASIK surgery. Found to have pancreatic enzymes elevation and diagnosed with acute pancreatitis .Admitted for pain management ,iv hydration and GI EVALUATION .Patient was initally presented to [place] ER and CT abdomen was done ,demonstrated MILD PERIPANCREATIC INFLAMMATORY CHANGES SURROUNDING THE PANCREATIC BODY AND TAIL REFLECTIVE OF MILD INTERSTITIAL PANCREATITIS .WELL-DEFINED CYSTIC FLUID COLLECTION WITHOUT RIM ENHANCEMENT IN THE PANCREATIC BODY MEASURING UP TO 2.8 X 2.4 CM WHICH MAY REPRESENT A PSEUDOCYST .Surg evaluation requested . (02 Oct 2020 07:35)        SUBJECTIVE:      ALLERGIES:  Allergies    latex (Other)  latex (Rash)  Novocain (Rash)    Intolerances          MEDICATIONS  (STANDING):  dextrose 5%. 1000 milliLiter(s) (50 mL/Hr) IV Continuous <Continuous>  dextrose 50% Injectable 12.5 Gram(s) IV Push once  dextrose 50% Injectable 25 Gram(s) IV Push once  dextrose 50% Injectable 25 Gram(s) IV Push once  enoxaparin Injectable 40 milliGRAM(s) SubCutaneous daily  insulin lispro (HumaLOG) corrective regimen sliding scale   SubCutaneous Before meals and at bedtime  levothyroxine 50 MICROGram(s) Oral daily  melatonin 5 milliGRAM(s) Oral at bedtime  pantoprazole    Tablet 40 milliGRAM(s) Oral before breakfast  polyethylene glycol 3350 17 Gram(s) Oral daily  senna 2 Tablet(s) Oral at bedtime  sodium chloride 0.9%. 1000 milliLiter(s) (75 mL/Hr) IV Continuous <Continuous>    MEDICATIONS  (PRN):  acetaminophen   Tablet .. 650 milliGRAM(s) Oral every 6 hours PRN Temp greater or equal to 38C (100.4F), Mild Pain (1 - 3)  dextrose 40% Gel 15 Gram(s) Oral once PRN Blood Glucose LESS THAN 70 milliGRAM(s)/deciliter  glucagon  Injectable 1 milliGRAM(s) IntraMuscular once PRN Glucose LESS THAN 70 milligrams/deciliter  morphine  - Injectable 2 milliGRAM(s) IV Push every 4 hours PRN Moderate Pain (4 - 6)  morphine  - Injectable 4 milliGRAM(s) IV Push every 6 hours PRN Severe Pain (7 - 10)  ondansetron Injectable 4 milliGRAM(s) IV Push every 6 hours PRN Nausea and/or Vomiting      REVIEW OF SYSTEMS:  CONSTITUTIONAL: No fever,  RESPIRATORY: No cough, wheezing, shortness of breath  CARDIOVASCULAR: No chest pain, dyspnea, palpitations, dizziness, syncope, paroxysmal nocturnal dyspnea, orthopnea, or arm or leg swelling  GASTROINTESTINAL: No abdominal  or epigastric pain, nausea, vomiting,  diarrhea  NEUROLOGICAL: No headaches,  loss of strength, numbness, or tremors    Vital Signs Last 24 Hrs  T(C): 37.3 (05 Oct 2020 21:36), Max: 37.3 (05 Oct 2020 21:36)  T(F): 99.2 (05 Oct 2020 21:36), Max: 99.2 (05 Oct 2020 21:36)  HR: 82 (05 Oct 2020 22:29) (82 - 86)  BP: 125/84 (05 Oct 2020 22:29) (114/79 - 129/80)  BP(mean): --  RR: 18 (05 Oct 2020 21:36) (18 - 18)  SpO2: 95% (05 Oct 2020 21:36) (95% - 97%)    PHYSICAL EXAM:  HEAD:  Atraumatic, Normocephalic  NECK: Supple and normal thyroid.  No JVD or carotid bruit.   HEART: S1, S2 regular , 1/6 soft ejection systolic murmur in mitral area , no thrill and no gallops .  PULMONARY: Bilateral vesicular breathing , few scattered ronchi and few scattered rales are present .  ABDOMEN: Soft nontender and positive bowl sounds   EXTREMITIES:  No clubbing, cyanosis, or pedal  edema  NEUROLOGICAL: AAOX3 , no focal deficit .    LABS:                        12.9   7.38  )-----------( 312      ( 05 Oct 2020 07:28 )             37.1     10-05    142  |  106  |  10  ----------------------------<  105<H>  3.6   |  30  |  0.77    Ca    9.0      05 Oct 2020 07:28              BNP      EKG:  ECHO:  IMAGING:    Assessment/Plan    Will continue to follow during hospital course and give further recommendations as needed . Thanks for your referral . if any questions please contact me at office ([phone])cell [phone]) STEPHAN TADEO MD [place] [address]  [address] OFFICE : [phone]  CELL : [phone]    CARDIOLOGY F/U:      HPI:  49 yo female with  hx of cholecystectomy presents to er with cc c/o 3 days of abdominal pain. Started pain is located mostly  in epigastric area but and also is  spreading across upper abdomen and  to back. Some nausea, no vomiting. No fever.  No urinary c/o. Patient has a lap band for many years. Unsure if there is any fluid in it. Scheduled to have it removed Past med hx is significant for Blighted ovum  ,DM (diabetes mellitus)  ,Hypothyroid  ,Latex sensitivity  ,Missed   ,Obesity. ,H/O laparoscopic adjustable gastric banding  ,H/O:   ,History of cholecystectomy  ,S/P LASIK surgery. Found to have pancreatic enzymes elevation and diagnosed with acute pancreatitis .Admitted for pain management ,iv hydration and GI EVALUATION .Patient was initally presented to [place] ER and CT abdomen was done ,demonstrated MILD PERIPANCREATIC INFLAMMATORY CHANGES SURROUNDING THE PANCREATIC BODY AND TAIL REFLECTIVE OF MILD INTERSTITIAL PANCREATITIS .WELL-DEFINED CYSTIC FLUID COLLECTION WITHOUT RIM ENHANCEMENT IN THE PANCREATIC BODY MEASURING UP TO 2.8 X 2.4 CM WHICH MAY REPRESENT A PSEUDOCYST .Surg evaluation requested . (02 Oct 2020 07:35)        SUBJECTIVE: Patient lying comfortable .      ALLERGIES:  Allergies    latex (Other)  latex (Rash)  Novocain (Rash)    Intolerances          MEDICATIONS  (STANDING):  dextrose 5%. 1000 milliLiter(s) (50 mL/Hr) IV Continuous <Continuous>  dextrose 50% Injectable 12.5 Gram(s) IV Push once  dextrose 50% Injectable 25 Gram(s) IV Push once  dextrose 50% Injectable 25 Gram(s) IV Push once  enoxaparin Injectable 40 milliGRAM(s) SubCutaneous daily  insulin lispro (HumaLOG) corrective regimen sliding scale   SubCutaneous Before meals and at bedtime  levothyroxine 50 MICROGram(s) Oral daily  melatonin 5 milliGRAM(s) Oral at bedtime  pantoprazole    Tablet 40 milliGRAM(s) Oral before breakfast  polyethylene glycol 3350 17 Gram(s) Oral daily  senna 2 Tablet(s) Oral at bedtime  sodium chloride 0.9%. 1000 milliLiter(s) (75 mL/Hr) IV Continuous <Continuous>    MEDICATIONS  (PRN):  acetaminophen   Tablet .. 650 milliGRAM(s) Oral every 6 hours PRN Temp greater or equal to 38C (100.4F), Mild Pain (1 - 3)  dextrose 40% Gel 15 Gram(s) Oral once PRN Blood Glucose LESS THAN 70 milliGRAM(s)/deciliter  glucagon  Injectable 1 milliGRAM(s) IntraMuscular once PRN Glucose LESS THAN 70 milligrams/deciliter  morphine  - Injectable 2 milliGRAM(s) IV Push every 4 hours PRN Moderate Pain (4 - 6)  morphine  - Injectable 4 milliGRAM(s) IV Push every 6 hours PRN Severe Pain (7 - 10)  ondansetron Injectable 4 milliGRAM(s) IV Push every 6 hours PRN Nausea and/or Vomiting      REVIEW OF SYSTEMS:  CONSTITUTIONAL: No fever,  RESPIRATORY: No cough, wheezing, shortness of breath  CARDIOVASCULAR: No chest pain, dyspnea, palpitations, dizziness, syncope, paroxysmal nocturnal dyspnea, orthopnea, or arm or leg swelling  GASTROINTESTINAL: No abdominal  or epigastric pain, nausea, vomiting,  diarrhea  NEUROLOGICAL: No headaches,  loss of strength, numbness, or tremors  Skin : No itching .  Hematology : No bleeding   Endocrinology : No heat and cold intolerance .  Psychiatry : Patient is calm .  Musculoskeletal : Patient has back pain     Vital Signs Last 24 Hrs  T(C): 37.3 (05 Oct 2020 21:36), Max: 37.3 (05 Oct 2020 21:36)  T(F): 99.2 (05 Oct 2020 21:36), Max: 99.2 (05 Oct 2020 21:36)  HR: 82 (05 Oct 2020 22:29) (82 - 86)  BP: 125/84 (05 Oct 2020 22:29) (114/79 - 129/80)  BP(mean): --  RR: 18 (05 Oct 2020 21:36) (18 - 18)  SpO2: 95% (05 Oct 2020 21:36) (95% - 97%)    PHYSICAL EXAM:  HEAD:  Atraumatic, Normocephalic  NECK: Supple and normal thyroid.  No JVD or carotid bruit.   HEART: S1, S2 regular , 1/6 soft ejection systolic murmur in mitral area , no thrill and no gallops .  PULMONARY: Bilateral vesicular breathing , few scattered ronchi and few scattered rales are present .  ABDOMEN: Soft nontender and positive bowl sounds   EXTREMITIES:  No clubbing, cyanosis, or pedal  edema  NEUROLOGICAL: AAOX3 , no focal deficit .  Skin : No rashes .  Musculoskeletal : No joint swellings .    LABS:                        12.9   7.38  )-----------( 312      ( 05 Oct 2020 07:28 )             37.1     10-05    142  |  106  |  10  ----------------------------<  105<H>  3.6   |  30  |  0.77    Ca    9.0      05 Oct 2020 07:28              Assessment/Plan  Patient has :  1) Epigastric pain . Acute pancreatitis and better .  2) Pancreatic cyst .  3) Mild MR and Mild TR on echo .  Plan : 1) Cardiac stable so far . 2) Continue I/V hydration 3) Monitor electrolytes .  Will continue to follow during hospital course and give further recommendations as needed . Thanks for your referral . if any questions please contact me at office ([phone])cell [phone])

## 2020-10-05 NOTE — PROGRESS NOTE ADULT - NSHPATTENDINGPLANDISCUSS_GEN_ALL_CORE
MED STAFF on 2 n ,   , GI TEAM ,  AND PATIENT . ANTICIPATE D/C IN 24 HRS IF TOLERATES FURTHER DIET ADVANCEMENT WELL

## 2020-10-05 NOTE — PROGRESS NOTE ADULT - SUBJECTIVE AND OBJECTIVE BOX
INTERVAL HPI/OVERNIGHT EVENTS:  feels better    MEDICATIONS  (STANDING):  dextrose 5%. 1000 milliLiter(s) (50 mL/Hr) IV Continuous <Continuous>  dextrose 50% Injectable 12.5 Gram(s) IV Push once  dextrose 50% Injectable 25 Gram(s) IV Push once  dextrose 50% Injectable 25 Gram(s) IV Push once  enoxaparin Injectable 40 milliGRAM(s) SubCutaneous daily  insulin lispro (HumaLOG) corrective regimen sliding scale   SubCutaneous Before meals and at bedtime  levothyroxine 50 MICROGram(s) Oral daily  melatonin 5 milliGRAM(s) Oral at bedtime  pantoprazole    Tablet 40 milliGRAM(s) Oral before breakfast  polyethylene glycol 3350 17 Gram(s) Oral daily  senna 2 Tablet(s) Oral at bedtime  sodium chloride 0.9%. 1000 milliLiter(s) (75 mL/Hr) IV Continuous <Continuous>    MEDICATIONS  (PRN):  acetaminophen   Tablet .. 650 milliGRAM(s) Oral every 6 hours PRN Temp greater or equal to 38C (100.4F), Mild Pain (1 - 3)  dextrose 40% Gel 15 Gram(s) Oral once PRN Blood Glucose LESS THAN 70 milliGRAM(s)/deciliter  glucagon  Injectable 1 milliGRAM(s) IntraMuscular once PRN Glucose LESS THAN 70 milligrams/deciliter  morphine  - Injectable 2 milliGRAM(s) IV Push every 4 hours PRN Moderate Pain (4 - 6)  morphine  - Injectable 4 milliGRAM(s) IV Push every 6 hours PRN Severe Pain (7 - 10)  ondansetron Injectable 4 milliGRAM(s) IV Push every 6 hours PRN Nausea and/or Vomiting      Allergies    latex (Other)  latex (Rash)  Novocain (Rash)    Intolerances        Review of Systems:    General:  No wt loss, fevers, chills, night sweats,fatigue,   Eyes:  Good vision, no reported pain  ENT:  No sore throat, pain, runny nose, dysphagia  CV:  No pain, palpitatioins, hypo/hypertension  Resp:  No dyspnea, cough, tachypnea, wheezing  GI:  No pain, No nausea, No vomiting, No diarrhea, No constipatiion, No weight loss, No fever, No pruritis, No rectal bleeding, No tarry stools, No dysphagia,  :  No pain, bleeding, incontinence, nocturia  Muscle:  No pain, weakness  Neuro:  No weakness, tingling, memory problems  Psych:  No fatigue, insomnia, mood problems, depression  Endocrine:  No polyuria, polydypsia, cold/heat intolerance  Heme:  No petechiae, ecchymosis, easy bruisability  Skin:  No rash, tattoos, scars, edema      Vital Signs Last 24 Hrs  T(C): 36.7 (05 Oct 2020 13:19), Max: 36.7 (04 Oct 2020 21:23)  T(F): 98 (05 Oct 2020 13:19), Max: 98.1 (04 Oct 2020 21:23)  HR: 86 (05 Oct 2020 13:19) (78 - 86)  BP: 129/80 (05 Oct 2020 13:19) (114/79 - 136/85)  BP(mean): --  RR: 18 (05 Oct 2020 13:19) (18 - 18)  SpO2: 97% (05 Oct 2020 13:19) (92% - 97%)    PHYSICAL EXAM:    Constitutional: NAD, well-developed  HEENT: EOMI, throat clear  Neck: No LAD, supple  Respiratory: CTA and P  Cardiovascular: S1 and S2, RRR, no M  Gastrointestinal: BS+, soft, NT/ND, neg HSM,  Extremities: No peripheral edema, neg clubing, cyanosis  Vascular: 2+ peripheral pulses  Neurological: A/O x 3, no focal deficits  Psychiatric: Normal mood, normal affect  Skin: No rashes      LABS:                        12.9   7.38  )-----------( 312      ( 05 Oct 2020 07:28 )             37.1     10-05    142  |  106  |  10  ----------------------------<  105<H>  3.6   |  30  |  0.77    Ca    9.0      05 Oct 2020 07:28            RADIOLOGY & ADDITIONAL TESTS:

## 2020-10-05 NOTE — PROGRESS NOTE ADULT - SUBJECTIVE AND OBJECTIVE BOX
pt seen  feeling better  minimal pain  ICU Vital Signs Last 24 Hrs  T(C): 36.7 (05 Oct 2020 04:57), Max: 37.1 (04 Oct 2020 14:13)  T(F): 98.1 (05 Oct 2020 04:57), Max: 98.8 (04 Oct 2020 14:13)  HR: 84 (05 Oct 2020 04:57) (78 - 84)  BP: 114/79 (05 Oct 2020 04:57) (114/79 - 138/81)  BP(mean): --  ABP: --  ABP(mean): --  RR: 18 (05 Oct 2020 04:57) (18 - 18)  SpO2: 95% (05 Oct 2020 04:57) (92% - 95%)  gen-NAD  resp-clear  abd-soft ND, tender epigastric area                          12.9   7.38  )-----------( 312      ( 05 Oct 2020 07:28 )             37.1   10-05    142  |  106  |  10  ----------------------------<  105<H>  3.6   |  30  |  0.77    Ca    9.0      05 Oct 2020 07:28

## 2020-10-05 NOTE — PROGRESS NOTE ADULT - PROBLEM SELECTOR PLAN 1
RESOLVING  , ,PAIN MANAGEMENT , GI CONSULT ,SERIAL AMYLASE AND LIPASE .ENDOSCOPIC US WAS RECOMMENDED BY GI AS OUTPATIENT FOR CYST EVALUATION ,PATIENT IS AWARE OF PLAN

## 2020-10-05 NOTE — DIETITIAN INITIAL EVALUATION ADULT. - ETIOLOGY
related to alteration in GI tract structure/function related to excessive energy intake related to excessive energy intake vs thyroid disorder affecting weight

## 2020-10-05 NOTE — PROGRESS NOTE ADULT - SUBJECTIVE AND OBJECTIVE BOX
PROGRESS NOTE  Patient is a 50y old  Female who presents with a chief complaint of abdominal pain (05 Oct 2020 11:45)  Chart and available morning labs /imaging are reviewed electronically , urgent issues addressed . More information  is being added upon completion of rounds , when more information is collected and management discussed with consultants , medical staff and social service/case management on the floor   LATE ENTRY- patient was seen and examined earlier today . Plan of care was discussed with med staff and unit coordinator .   OVERNIGHT  No new issues reported by medical staff . All above noted Patient is resting in a bed comfortably ..No distress noted   Started on full liquids ,still has mild abd discomfort ,but required pain medication only once in last 24 hrs No nausea   HPI:  49 yo female with  hx of cholecystectomy presents to er with cc c/o 3 days of abdominal pain. Started pain is located mostly  in epigastric area but and also is  spreading across upper abdomen and  to back. Some nausea, no vomiting. No fever.  No urinary c/o. Patient has a lap band for many years. Unsure if there is any fluid in it. Scheduled to have it removed Past med hx is significant for Blighted ovum  ,DM (diabetes mellitus)  ,Hypothyroid  ,Latex sensitivity  ,Missed   ,Obesity. ,H/O laparoscopic adjustable gastric banding  ,H/O:   ,History of cholecystectomy  ,S/P LASIK surgery. Found to have pancreatic enzymes elevation and diagnosed with acute pancreatitis .Admitted for pain management ,iv hydration and GI EVALUATION .Patient was initally presented to [place] ER and CT abdomen was done ,demonstrated MILD PERIPANCREATIC INFLAMMATORY CHANGES SURROUNDING THE PANCREATIC BODY AND TAIL REFLECTIVE OF MILD INTERSTITIAL PANCREATITIS .WELL-DEFINED CYSTIC FLUID COLLECTION WITHOUT RIM ENHANCEMENT IN THE PANCREATIC BODY MEASURING UP TO 2.8 X 2.4 CM WHICH MAY REPRESENT A PSEUDOCYST .Surg evaluation requested . (02 Oct 2020 07:35)    PAST MEDICAL & SURGICAL HISTORY:  Obesity    Missed     Latex sensitivity    DM (diabetes mellitus)    Blighted ovum    Hypothyroid    History of hypothyroidism    Morbid obesity due to excess calories  BMI 38.1    Latex Sensitivity    Blighted Ovum    Obesity    Missed     Diabetes Mellitus Type II    H/O laparoscopic adjustable gastric banding    S/P LASIK surgery    History of cholecystectomy    H/O:     History of cervical spinal surgery  fusion, anterior approach    Status Post Gastric Banding      Status Post Eye Surgery  lasik    S/P  Section  x 2    S/P Cholecystectomy        MEDICATIONS  (STANDING):  dextrose 5%. 1000 milliLiter(s) (50 mL/Hr) IV Continuous <Continuous>  dextrose 50% Injectable 12.5 Gram(s) IV Push once  dextrose 50% Injectable 25 Gram(s) IV Push once  dextrose 50% Injectable 25 Gram(s) IV Push once  enoxaparin Injectable 40 milliGRAM(s) SubCutaneous daily  insulin lispro (HumaLOG) corrective regimen sliding scale   SubCutaneous Before meals and at bedtime  levothyroxine 50 MICROGram(s) Oral daily  melatonin 5 milliGRAM(s) Oral at bedtime  pantoprazole    Tablet 40 milliGRAM(s) Oral before breakfast  polyethylene glycol 3350 17 Gram(s) Oral daily  senna 2 Tablet(s) Oral at bedtime    MEDICATIONS  (PRN):  acetaminophen   Tablet .. 650 milliGRAM(s) Oral every 6 hours PRN Temp greater or equal to 38C (100.4F), Mild Pain (1 - 3)  dextrose 40% Gel 15 Gram(s) Oral once PRN Blood Glucose LESS THAN 70 milliGRAM(s)/deciliter  glucagon  Injectable 1 milliGRAM(s) IntraMuscular once PRN Glucose LESS THAN 70 milligrams/deciliter  morphine  - Injectable 2 milliGRAM(s) IV Push every 4 hours PRN Moderate Pain (4 - 6)  morphine  - Injectable 4 milliGRAM(s) IV Push every 6 hours PRN Severe Pain (7 - 10)  ondansetron Injectable 4 milliGRAM(s) IV Push every 6 hours PRN Nausea and/or Vomiting      OBJECTIVE    T(C): 36.7 (10-05-20 @ 13:19), Max: 36.7 (10-04-20 @ 21:23)  HR: 86 (10-05-20 @ 13:19) (78 - 86)  BP: 129/80 (10-05-20 @ 13:19) (114/79 - 136/85)  RR: 18 (10-05-20 @ 13:19) (18 - 18)  SpO2: 97% (10-05-20 @ 13:19) (92% - 97%)  Wt(kg): --  I&O's Summary        REVIEW OF SYSTEMS:  CONSTITUTIONAL: No fever, weight loss, or fatigue  EYES: No eye pain, visual disturbances, or discharge  ENMT:   No sinus or throat pain  NECK: No pain or stiffness  RESPIRATORY: No cough, wheezing, chills or hemoptysis; No shortness of breath  CARDIOVASCULAR: No chest pain, palpitations, dizziness, or leg swelling  GASTROINTESTINAL: No abdominal pain. No nausea, vomiting; No diarrhea or constipation. No melena or hematochezia.  GENITOURINARY: No dysuria, frequency, hematuria, or incontinence  NEUROLOGICAL: No headaches, memory loss, loss of strength, numbness, or tremors  SKIN: No itching, burning, rashes, or lesions   MUSCULOSKELETAL: No joint pain or swelling; No muscle, back, or extremity pain    PHYSICAL EXAM:  Appearance: NAD. VS past 24 hrs -as above   HEENT:   Moist oral mucosa. Conjunctiva clear b/l.   Neck : supple  Respiratory: Lungs CTAB.  Gastrointestinal:  Soft, nontender. No rebound. No rigidity. BS present	  Cardiovascular: RRR ,S1S2 present  Neurologic: Non-focal. Moving all extremities.  Extremities: No edema. No erythema. No calf tenderness.  Skin: No rashes, No ecchymoses, No cyanosis.	  wounds ,skin lesions-See skin assesment flow sheet   LABS:                        12.9   7.38  )-----------( 312      ( 05 Oct 2020 07:28 )             37.1     10-05    142  |  106  |  10  ----------------------------<  105<H>  3.6   |  30  |  0.77    Ca    9.0      05 Oct 2020 07:28      CAPILLARY BLOOD GLUCOSE      POCT Blood Glucose.: 106 mg/dL (05 Oct 2020 11:27)  POCT Blood Glucose.: 119 mg/dL (05 Oct 2020 07:46)  POCT Blood Glucose.: 109 mg/dL (04 Oct 2020 21:54)  POCT Blood Glucose.: 138 mg/dL (04 Oct 2020 17:18)          Culture - Blood (collected 02 Oct 2020 14:52)  Source: .Blood Blood  Preliminary Report (03 Oct 2020 15:04):    No growth to date.      RADIOLOGY & ADDITIONAL TESTS:   reviewed elctronically  ASSESSMENT/PLAN: 	    45minutes spent on this visit, 50% visit time spent in care co-ordination with other attendings and counselling patient  I have discussed care plan with patient and HCP,expressed understanding of problems treatment and their effect and side effects, alternatives in detail,I have asked if they have any questions and concerns and appropriately addressed them to best of my ability

## 2020-10-05 NOTE — DIETITIAN INITIAL EVALUATION ADULT. - ADD RECOMMEND
Recommend advancing diet as tolerated to Dash/TLC, Consistent Carbohydrate with emphasis on HBV protein sources. Recommend advancing diet as tolerated to Dash/TLC, low fat, consistent carbohydrate with emphasis on HBV protein sources.

## 2020-10-05 NOTE — DIETITIAN INITIAL EVALUATION ADULT. - NUTRITIONGOAL OUTCOME2
Pt to comply w/ rx therapeutic diet to facilitate gradual wt loss until BMI w/n more desirable range

## 2020-10-06 ENCOUNTER — TRANSCRIPTION ENCOUNTER (OUTPATIENT)
Age: 50
End: 2020-10-06

## 2020-10-06 DIAGNOSIS — E11.65 TYPE 2 DIABETES MELLITUS WITH HYPERGLYCEMIA: ICD-10-CM

## 2020-10-06 DIAGNOSIS — R10.13 EPIGASTRIC PAIN: ICD-10-CM

## 2020-10-06 LAB
AMYLASE P1 CFR SERPL: 44 U/L — SIGNIFICANT CHANGE UP (ref 25–125)
ANION GAP SERPL CALC-SCNC: 8 MMOL/L — SIGNIFICANT CHANGE UP (ref 5–17)
BUN SERPL-MCNC: 10 MG/DL — SIGNIFICANT CHANGE UP (ref 7–23)
CALCIUM SERPL-MCNC: 8.8 MG/DL — SIGNIFICANT CHANGE UP (ref 8.5–10.1)
CHLORIDE SERPL-SCNC: 107 MMOL/L — SIGNIFICANT CHANGE UP (ref 96–108)
CO2 SERPL-SCNC: 27 MMOL/L — SIGNIFICANT CHANGE UP (ref 22–31)
CREAT SERPL-MCNC: 0.75 MG/DL — SIGNIFICANT CHANGE UP (ref 0.5–1.3)
GLUCOSE SERPL-MCNC: 103 MG/DL — HIGH (ref 70–99)
HCT VFR BLD CALC: 35.9 % — SIGNIFICANT CHANGE UP (ref 34.5–45)
HGB BLD-MCNC: 12.8 G/DL — SIGNIFICANT CHANGE UP (ref 11.5–15.5)
IGG SERPL-MCNC: 1210 MG/DL — SIGNIFICANT CHANGE UP (ref 586–1602)
IGG1 SER-MCNC: 447 MG/DL — SIGNIFICANT CHANGE UP (ref 248–810)
IGG2 SER-MCNC: 588 MG/DL — HIGH (ref 130–555)
IGG3 SER-MCNC: 38 MG/DL — SIGNIFICANT CHANGE UP (ref 15–102)
IGG4 SER-MCNC: 52 MG/DL — SIGNIFICANT CHANGE UP (ref 2–96)
LIDOCAIN IGE QN: 467 U/L — HIGH (ref 73–393)
MCHC RBC-ENTMCNC: 29.8 PG — SIGNIFICANT CHANGE UP (ref 27–34)
MCHC RBC-ENTMCNC: 35.7 GM/DL — SIGNIFICANT CHANGE UP (ref 32–36)
MCV RBC AUTO: 83.7 FL — SIGNIFICANT CHANGE UP (ref 80–100)
NRBC # BLD: 0 /100 WBCS — SIGNIFICANT CHANGE UP (ref 0–0)
PLATELET # BLD AUTO: 317 K/UL — SIGNIFICANT CHANGE UP (ref 150–400)
POTASSIUM SERPL-MCNC: 3.8 MMOL/L — SIGNIFICANT CHANGE UP (ref 3.5–5.3)
POTASSIUM SERPL-SCNC: 3.8 MMOL/L — SIGNIFICANT CHANGE UP (ref 3.5–5.3)
RBC # BLD: 4.29 M/UL — SIGNIFICANT CHANGE UP (ref 3.8–5.2)
RBC # FLD: 12.5 % — SIGNIFICANT CHANGE UP (ref 10.3–14.5)
SODIUM SERPL-SCNC: 142 MMOL/L — SIGNIFICANT CHANGE UP (ref 135–145)
WBC # BLD: 6.58 K/UL — SIGNIFICANT CHANGE UP (ref 3.8–10.5)
WBC # FLD AUTO: 6.58 K/UL — SIGNIFICANT CHANGE UP (ref 3.8–10.5)

## 2020-10-06 RX ORDER — ACETAMINOPHEN 500 MG
2 TABLET ORAL
Qty: 0 | Refills: 0 | DISCHARGE
Start: 2020-10-06

## 2020-10-06 RX ORDER — SIMETHICONE 80 MG/1
80 TABLET, CHEWABLE ORAL
Refills: 0 | Status: DISCONTINUED | OUTPATIENT
Start: 2020-10-06 | End: 2020-10-07

## 2020-10-06 RX ORDER — PANTOPRAZOLE SODIUM 20 MG/1
1 TABLET, DELAYED RELEASE ORAL
Qty: 30 | Refills: 0
Start: 2020-10-06 | End: 2020-11-04

## 2020-10-06 RX ORDER — SIMETHICONE 80 MG/1
1 TABLET, CHEWABLE ORAL
Qty: 40 | Refills: 0
Start: 2020-10-06 | End: 2020-10-15

## 2020-10-06 RX ORDER — LEVOTHYROXINE SODIUM 125 MCG
1 TABLET ORAL
Qty: 0 | Refills: 0 | DISCHARGE
Start: 2020-10-06

## 2020-10-06 RX ORDER — TRAMADOL HYDROCHLORIDE 50 MG/1
1 TABLET ORAL
Qty: 20 | Refills: 0
Start: 2020-10-06 | End: 2020-10-10

## 2020-10-06 RX ORDER — LANOLIN ALCOHOL/MO/W.PET/CERES
1 CREAM (GRAM) TOPICAL
Qty: 0 | Refills: 0 | DISCHARGE
Start: 2020-10-06

## 2020-10-06 RX ORDER — LEVOTHYROXINE SODIUM 125 MCG
1 TABLET ORAL
Qty: 0 | Refills: 0 | DISCHARGE

## 2020-10-06 RX ORDER — TRAMADOL HYDROCHLORIDE 50 MG/1
50 TABLET ORAL EVERY 6 HOURS
Refills: 0 | Status: DISCONTINUED | OUTPATIENT
Start: 2020-10-06 | End: 2020-10-07

## 2020-10-06 RX ORDER — SENNA PLUS 8.6 MG/1
2 TABLET ORAL
Qty: 0 | Refills: 0 | DISCHARGE
Start: 2020-10-06

## 2020-10-06 RX ADMIN — POLYETHYLENE GLYCOL 3350 17 GRAM(S): 17 POWDER, FOR SOLUTION ORAL at 11:21

## 2020-10-06 RX ADMIN — Medication 5 MILLIGRAM(S): at 21:15

## 2020-10-06 RX ADMIN — ENOXAPARIN SODIUM 40 MILLIGRAM(S): 100 INJECTION SUBCUTANEOUS at 11:13

## 2020-10-06 RX ADMIN — PANTOPRAZOLE SODIUM 40 MILLIGRAM(S): 20 TABLET, DELAYED RELEASE ORAL at 06:13

## 2020-10-06 RX ADMIN — Medication 50 MICROGRAM(S): at 06:13

## 2020-10-06 RX ADMIN — Medication 650 MILLIGRAM(S): at 21:15

## 2020-10-06 RX ADMIN — Medication 650 MILLIGRAM(S): at 22:00

## 2020-10-06 RX ADMIN — SENNA PLUS 2 TABLET(S): 8.6 TABLET ORAL at 21:15

## 2020-10-06 NOTE — PROGRESS NOTE ADULT - PROBLEM SELECTOR PLAN 4
CORRECTIVE REGIMEN SLIDING SCALE ON  SYNTHROID ,SPOKE TO DR.PERLMAN ,OK TO D/C HOME BACK ON 75 MCG SINCE LOW TSH LIKELY WAS RELATED TO PANCREATITIS ,OUTPATIENT FOLLOWUP IN THE OFFICE

## 2020-10-06 NOTE — DISCHARGE NOTE PROVIDER - CARE PROVIDER_API CALL
Eder Isaac  GASTROENTEROLOGY  [address] [place] [address]  Phone: [phone]  Fax: [phone]  Follow Up Time: 1 week   Eder Isaac  GASTROENTEROLOGY  [address] [place] [address]  Phone: [phone]  Fax: [phone]  Follow Up Time: 1 week    Perlman, Craig D  [address], [address]  [place] [address]  Phone: [phone]  Fax: [phone]  Follow Up Time: 1 month   Eder Isaac  GASTROENTEROLOGY  [address] [place] [address]  Phone: [phone]  Fax: [phone]  Follow Up Time: 1 week    Perlman, Craig D  [facility]  [address], [address]  [place] [address]  Phone: [phone]  Fax: [phone]  Follow Up Time: 1 month    EARL BEDOLLA  Internal Medicine  [address]  [place] [address]  Phone: [phone]  Fax: [phone]  Follow Up Time: 1 month

## 2020-10-06 NOTE — DISCHARGE NOTE PROVIDER - CARE PROVIDERS DIRECT ADDRESSES
,[email or web address],DirectAddress_Unknown ,[email or web address],DirectAddress_Unknown,DirectAddress_Unknown

## 2020-10-06 NOTE — PROGRESS NOTE ADULT - NSHPATTENDINGPLANDISCUSS_GEN_ALL_CORE
MED STAFF on 2 n ,   , GI TEAM ,  AND PATIENT . ANTICIPATE D/C HOME WITHIN NEXT 24 HRS  IF TOLERATES REGULAR  DIET  WELL

## 2020-10-06 NOTE — DISCHARGE NOTE PROVIDER - NSDCFUADDAPPT_GEN_ALL_CORE_FT
1)Please schedule outpatient endoscopic US in GI office ,2)please obtain pancreatic enzymes test within one week after discharge , 3)abdominal MRI every 6 mnts for 2 years ,followup pancreatic cyst 4) call GI OFFICE to schedule appointment 5) followup with endocrinologist regarding DM ,hypothyroidism  and weight management 1)Please schedule outpatient endoscopic US in GI office ,2)please obtain pancreatic enzymes test within one week after discharge ( in GI office )  , 3)abdominal MRI every 6 mnts for 2 years ,followup pancreatic cyst 4) call GI OFFICE to schedule appointment 5) followup with endocrinologist regarding DM ,hypothyroidism  and weight management

## 2020-10-06 NOTE — PROGRESS NOTE ADULT - SUBJECTIVE AND OBJECTIVE BOX
PROGRESS NOTE  Patient is a 50y old  Female who presents with a chief complaint of abdominal pain (06 Oct 2020 14:43)  Chart and available morning labs /imaging are reviewed electronically , urgent issues addressed . More information  is being added upon completion of rounds , when more information is collected and management discussed with consultants , medical staff and social service/case management on the floor   LATE ENTRY- patient was seen and examined earlier today . Plan of care was discussed with med staff and unit coordinator .   OVERNIGHT  No new issues reported by medical staff . All above noted Patient is resting in a bed comfortably . .No distress noted   Feels better ,but has fullness and gaseous feeling ,mylicon prn added Cleared by GI for d/c if tolerates advanced diet well   HPI:  49 yo female with  hx of cholecystectomy presents to er with cc c/o 3 days of abdominal pain. Started pain is located mostly  in epigastric area but and also is  spreading across upper abdomen and  to back. Some nausea, no vomiting. No fever.  No urinary c/o. Patient has a lap band for many years. Unsure if there is any fluid in it. Scheduled to have it removed Past med hx is significant for Blighted ovum  ,DM (diabetes mellitus)  ,Hypothyroid  ,Latex sensitivity  ,Missed   ,Obesity. ,H/O laparoscopic adjustable gastric banding  ,H/O:   ,History of cholecystectomy  ,S/P LASIK surgery. Found to have pancreatic enzymes elevation and diagnosed with acute pancreatitis .Admitted for pain management ,iv hydration and GI EVALUATION .Patient was initally presented to [place] ER and CT abdomen was done ,demonstrated MILD PERIPANCREATIC INFLAMMATORY CHANGES SURROUNDING THE PANCREATIC BODY AND TAIL REFLECTIVE OF MILD INTERSTITIAL PANCREATITIS .WELL-DEFINED CYSTIC FLUID COLLECTION WITHOUT RIM ENHANCEMENT IN THE PANCREATIC BODY MEASURING UP TO 2.8 X 2.4 CM WHICH MAY REPRESENT A PSEUDOCYST .Surg evaluation requested . (02 Oct 2020 07:35)    PAST MEDICAL & SURGICAL HISTORY:  Obesity    Missed     Latex sensitivity    DM (diabetes mellitus)    Blighted ovum    Hypothyroid    History of hypothyroidism    Morbid obesity due to excess calories  BMI 38.1    Latex Sensitivity    Blighted Ovum    Obesity    Missed     Diabetes Mellitus Type II    H/O laparoscopic adjustable gastric banding    S/P LASIK surgery    History of cholecystectomy    H/O:     History of cervical spinal surgery  fusion, anterior approach    Status Post Gastric Banding      Status Post Eye Surgery  lasik    S/P  Section  x 2    S/P Cholecystectomy        MEDICATIONS  (STANDING):  dextrose 5%. 1000 milliLiter(s) (50 mL/Hr) IV Continuous <Continuous>  dextrose 50% Injectable 12.5 Gram(s) IV Push once  dextrose 50% Injectable 25 Gram(s) IV Push once  dextrose 50% Injectable 25 Gram(s) IV Push once  enoxaparin Injectable 40 milliGRAM(s) SubCutaneous daily  insulin lispro (HumaLOG) corrective regimen sliding scale   SubCutaneous Before meals and at bedtime  levothyroxine 50 MICROGram(s) Oral daily  melatonin 5 milliGRAM(s) Oral at bedtime  pantoprazole    Tablet 40 milliGRAM(s) Oral before breakfast  polyethylene glycol 3350 17 Gram(s) Oral daily  senna 2 Tablet(s) Oral at bedtime    MEDICATIONS  (PRN):  acetaminophen   Tablet .. 650 milliGRAM(s) Oral every 6 hours PRN Temp greater or equal to 38C (100.4F), Mild Pain (1 - 3)  dextrose 40% Gel 15 Gram(s) Oral once PRN Blood Glucose LESS THAN 70 milliGRAM(s)/deciliter  glucagon  Injectable 1 milliGRAM(s) IntraMuscular once PRN Glucose LESS THAN 70 milligrams/deciliter  ondansetron Injectable 4 milliGRAM(s) IV Push every 6 hours PRN Nausea and/or Vomiting  simethicone 80 milliGRAM(s) Chew four times a day PRN Gas  traMADol 50 milliGRAM(s) Oral every 6 hours PRN Moderate Pain (4 - 6)      OBJECTIVE    T(C): 36.6 (10-06-20 @ 13:16), Max: 37.3 (10-05-20 @ 21:36)  HR: 94 (10-06-20 @ 13:16) (77 - 94)  BP: 108/78 (10-06-20 @ 13:16) (108/78 - 125/84)  RR: 16 (10-06-20 @ 13:16) (16 - 18)  SpO2: 94% (10-06-20 @ 13:16) (94% - 95%)  Wt(kg): --  I&O's Summary    05 Oct 2020 07:01  -  06 Oct 2020 07:00  --------------------------------------------------------  IN: 795 mL / OUT: 0 mL / NET: 795 mL          REVIEW OF SYSTEMS:  CONSTITUTIONAL: No fever, weight loss, or fatigue  EYES: No eye pain, visual disturbances, or discharge  ENMT:   No sinus or throat pain  NECK: No pain or stiffness  RESPIRATORY: No cough, wheezing, chills or hemoptysis; No shortness of breath  CARDIOVASCULAR: No chest pain, palpitations, dizziness, or leg swelling  GASTROINTESTINAL: No abdominal pain. No nausea, vomiting; No diarrhea or constipation. No melena or hematochezia.  GENITOURINARY: No dysuria, frequency, hematuria, or incontinence  NEUROLOGICAL: No headaches, memory loss, loss of strength, numbness, or tremors  SKIN: No itching, burning, rashes, or lesions   MUSCULOSKELETAL: No joint pain or swelling; No muscle, back, or extremity pain    PHYSICAL EXAM:  Appearance: NAD. VS past 24 hrs -as above   HEENT:   Moist oral mucosa. Conjunctiva clear b/l.   Neck : supple  Respiratory: Lungs CTAB.  Gastrointestinal:  Soft, nontender. No rebound. No rigidity. BS present	  Cardiovascular: RRR ,S1S2 present  Neurologic: Non-focal. Moving all extremities.  Extremities: No edema. No erythema. No calf tenderness.  Skin: No rashes, No ecchymoses, No cyanosis.	  wounds ,skin lesions-See skin assesment flow sheet   LABS:                        12.8   6.58  )-----------( 317      ( 06 Oct 2020 05:38 )             35.9     10-06    142  |  107  |  10  ----------------------------<  103<H>  3.8   |  27  |  0.75    Ca    8.8      06 Oct 2020 05:38    TPro  7.2  /  Alb  3.0<L>  /  TBili  0.4  /  DBili  .10  /  AST  18  /  ALT  34  /  AlkPhos  83  10-06    CAPILLARY BLOOD GLUCOSE      POCT Blood Glucose.: 94 mg/dL (06 Oct 2020 11:18)  POCT Blood Glucose.: 120 mg/dL (06 Oct 2020 07:41)  POCT Blood Glucose.: 138 mg/dL (05 Oct 2020 21:46)  POCT Blood Glucose.: 106 mg/dL (05 Oct 2020 16:31)          Culture - Blood (collected 02 Oct 2020 14:52)  Source: .Blood Blood  Preliminary Report (03 Oct 2020 15:04):    No growth to date.      RADIOLOGY & ADDITIONAL TESTS:   reviewed elctronically  ASSESSMENT/PLAN: 	    Patient was seen and examined on a day of discharge . Plan of care , discharge medications and recommendations discussed with consultants and clearance for discharge obtained .Social service , case management  and medical staff are aware of plan. Family is notified. Discharge summary  is  prepared electronically 75minutes spent on this visit, 50% visit time spent in care co-ordination with other attendings and counselling patient  I have discussed care plan with patient and she ,expressed understanding of problems treatment and their effect and side effects, alternatives in detail,I have asked if they have any questions and concerns and appropriately addressed them to best of my ability

## 2020-10-06 NOTE — PROGRESS NOTE ADULT - SUBJECTIVE AND OBJECTIVE BOX
[facility]       HPI:  49 yo female with  hx of cholecystectomy presents to er with cc c/o 3 days of abdominal pain. Started pain is located mostly  in epigastric area but and also is  spreading across upper abdomen and  to back. Some nausea, no vomiting. No fever.  No urinary c/o. Patient has a lap band for many years. Unsure if there is any fluid in it. Scheduled to have it removed Past med hx is significant for Blighted ovum  ,DM (diabetes mellitus)  ,Hypothyroid  ,Latex sensitivity  ,Missed   ,Obesity. ,H/O laparoscopic adjustable gastric banding  ,H/O:   ,History of cholecystectomy  ,S/P LASIK surgery. Found to have pancreatic enzymes elevation and diagnosed with acute pancreatitis .Admitted for pain management ,iv hydration and GI EVALUATION .Patient was initally presented to [place] ER and CT abdomen was done ,demonstrated MILD PERIPANCREATIC INFLAMMATORY CHANGES SURROUNDING THE PANCREATIC BODY AND TAIL REFLECTIVE OF MILD INTERSTITIAL PANCREATITIS .WELL-DEFINED CYSTIC FLUID COLLECTION WITHOUT RIM ENHANCEMENT IN THE PANCREATIC BODY MEASURING UP TO 2.8 X 2.4 CM WHICH MAY REPRESENT A PSEUDOCYST .Surg evaluation requested . (02 Oct 2020 07:35)        SUBJECTIVE:      ALLERGIES:  Allergies    latex (Other)  latex (Rash)  Novocain (Rash)    Intolerances          MEDICATIONS  (STANDING):  dextrose 5%. 1000 milliLiter(s) (50 mL/Hr) IV Continuous <Continuous>  dextrose 50% Injectable 12.5 Gram(s) IV Push once  dextrose 50% Injectable 25 Gram(s) IV Push once  dextrose 50% Injectable 25 Gram(s) IV Push once  enoxaparin Injectable 40 milliGRAM(s) SubCutaneous daily  insulin lispro (HumaLOG) corrective regimen sliding scale   SubCutaneous Before meals and at bedtime  levothyroxine 50 MICROGram(s) Oral daily  melatonin 5 milliGRAM(s) Oral at bedtime  pantoprazole    Tablet 40 milliGRAM(s) Oral before breakfast  polyethylene glycol 3350 17 Gram(s) Oral daily  senna 2 Tablet(s) Oral at bedtime    MEDICATIONS  (PRN):  acetaminophen   Tablet .. 650 milliGRAM(s) Oral every 6 hours PRN Temp greater or equal to 38C (100.4F), Mild Pain (1 - 3)  dextrose 40% Gel 15 Gram(s) Oral once PRN Blood Glucose LESS THAN 70 milliGRAM(s)/deciliter  glucagon  Injectable 1 milliGRAM(s) IntraMuscular once PRN Glucose LESS THAN 70 milligrams/deciliter  ondansetron Injectable 4 milliGRAM(s) IV Push every 6 hours PRN Nausea and/or Vomiting  simethicone 80 milliGRAM(s) Chew four times a day PRN Gas  traMADol 50 milliGRAM(s) Oral every 6 hours PRN Moderate Pain (4 - 6)      REVIEW OF SYSTEMS:  CONSTITUTIONAL: No fever,  RESPIRATORY: No cough, wheezing, shortness of breath  CARDIOVASCULAR: No chest pain, dyspnea, palpitations, dizziness, syncope, paroxysmal nocturnal dyspnea, orthopnea, or arm or leg swelling  GASTROINTESTINAL: No abdominal  or epigastric pain, nausea, vomiting,  diarrhea  NEUROLOGICAL: No headaches,  loss of strength, numbness, or tremors    Vital Signs Last 24 Hrs  T(C): 36.9 (06 Oct 2020 21:34), Max: 36.9 (06 Oct 2020 21:34)  T(F): 98.4 (06 Oct 2020 21:34), Max: 98.4 (06 Oct 2020 21:34)  HR: 83 (06 Oct 2020 21:34) (77 - 94)  BP: 115/79 (06 Oct 2020 21:34) (108/78 - 125/84)  BP(mean): --  RR: 18 (06 Oct 2020 21:34) (16 - 18)  SpO2: 94% (06 Oct 2020 21:34) (94% - 95%)    PHYSICAL EXAM:  HEAD:  Atraumatic, Normocephalic  NECK: Supple and normal thyroid.  No JVD or carotid bruit.   HEART: S1, S2 regular , 1/6 soft ejection systolic murmur in mitral area , no thrill and no gallops .  PULMONARY: Bilateral vesicular breathing , few scattered ronchi and few scattered rales are present .  ABDOMEN: Soft nontender and positive bowl sounds   EXTREMITIES:  No clubbing, cyanosis, or pedal  edema  NEUROLOGICAL: AAOX3 , no focal deficit .    LABS:                        12.8   6.58  )-----------( 317      ( 06 Oct 2020 05:38 )             35.9     10-    142  |  107  |  10  ----------------------------<  103<H>  3.8   |  27  |  0.75    Ca    8.8      06 Oct 2020 05:38    TPro  7.2  /  Alb  3.0<L>  /  TBili  0.4  /  DBili  .10  /  AST  18  /  ALT  34  /  AlkPhos  83  10-06            BNP      EKG:  ECHO:  IMAGING:    Assessment/Plan    Will continue to follow during hospital course and give further recommendations as needed . Thanks for your referral . if any questions please contact me at office ([phone])cell [phone]) STEPHAN TADEO MD [address] [address]  OFFICE : [phone]  CELL : [phone]    CARDIOLOGY F/U :       HPI:  49 yo female with  hx of cholecystectomy presents to er with cc c/o 3 days of abdominal pain. Started pain is located mostly  in epigastric area but and also is  spreading across upper abdomen and  to back. Some nausea, no vomiting. No fever.  No urinary c/o. Patient has a lap band for many years. Unsure if there is any fluid in it. Scheduled to have it removed Past med hx is significant for Blighted ovum  ,DM (diabetes mellitus)  ,Hypothyroid  ,Latex sensitivity  ,Missed   ,Obesity. ,H/O laparoscopic adjustable gastric banding  ,H/O:   ,History of cholecystectomy  ,S/P LASIK surgery. Found to have pancreatic enzymes elevation and diagnosed with acute pancreatitis .Admitted for pain management ,iv hydration and GI EVALUATION .Patient was initally presented to [place] ER and CT abdomen was done ,demonstrated MILD PERIPANCREATIC INFLAMMATORY CHANGES SURROUNDING THE PANCREATIC BODY AND TAIL REFLECTIVE OF MILD INTERSTITIAL PANCREATITIS .WELL-DEFINED CYSTIC FLUID COLLECTION WITHOUT RIM ENHANCEMENT IN THE PANCREATIC BODY MEASURING UP TO 2.8 X 2.4 CM WHICH MAY REPRESENT A PSEUDOCYST .Surg evaluation requested . (02 Oct 2020 07:35)        SUBJECTIVE: Patient lying comfortable .      ALLERGIES:  Allergies    latex (Other)  latex (Rash)  Novocain (Rash)    Intolerances          MEDICATIONS  (STANDING):  dextrose 5%. 1000 milliLiter(s) (50 mL/Hr) IV Continuous <Continuous>  dextrose 50% Injectable 12.5 Gram(s) IV Push once  dextrose 50% Injectable 25 Gram(s) IV Push once  dextrose 50% Injectable 25 Gram(s) IV Push once  enoxaparin Injectable 40 milliGRAM(s) SubCutaneous daily  insulin lispro (HumaLOG) corrective regimen sliding scale   SubCutaneous Before meals and at bedtime  levothyroxine 50 MICROGram(s) Oral daily  melatonin 5 milliGRAM(s) Oral at bedtime  pantoprazole    Tablet 40 milliGRAM(s) Oral before breakfast  polyethylene glycol 3350 17 Gram(s) Oral daily  senna 2 Tablet(s) Oral at bedtime    MEDICATIONS  (PRN):  acetaminophen   Tablet .. 650 milliGRAM(s) Oral every 6 hours PRN Temp greater or equal to 38C (100.4F), Mild Pain (1 - 3)  dextrose 40% Gel 15 Gram(s) Oral once PRN Blood Glucose LESS THAN 70 milliGRAM(s)/deciliter  glucagon  Injectable 1 milliGRAM(s) IntraMuscular once PRN Glucose LESS THAN 70 milligrams/deciliter  ondansetron Injectable 4 milliGRAM(s) IV Push every 6 hours PRN Nausea and/or Vomiting  simethicone 80 milliGRAM(s) Chew four times a day PRN Gas  traMADol 50 milliGRAM(s) Oral every 6 hours PRN Moderate Pain (4 - 6)      REVIEW OF SYSTEMS:  CONSTITUTIONAL: No fever,  RESPIRATORY: No cough, wheezing, shortness of breath  CARDIOVASCULAR: No chest pain, dyspnea, palpitations, dizziness, syncope, paroxysmal nocturnal dyspnea, orthopnea, or arm or leg swelling  GASTROINTESTINAL: No abdominal  or epigastric pain, nausea, vomiting,  diarrhea  NEUROLOGICAL: No headaches,  loss of strength, numbness, or tremors  Skin : No itching .  Hematology : No bleeding .  Endocrinology : No heat and cold intolerance .  Psychiatry : patient is calm .  Musculoskeletal : Patient has chronic back pain .      Vital Signs Last 24 Hrs  T(C): 36.9 (06 Oct 2020 21:34), Max: 36.9 (06 Oct 2020 21:34)  T(F): 98.4 (06 Oct 2020 21:34), Max: 98.4 (06 Oct 2020 21:34)  HR: 83 (06 Oct 2020 21:34) (77 - 94)  BP: 115/79 (06 Oct 2020 21:34) (108/78 - 125/84)  BP(mean): --  RR: 18 (06 Oct 2020 21:34) (16 - 18)  SpO2: 94% (06 Oct 2020 21:34) (94% - 95%)    PHYSICAL EXAM:  HEAD:  Atraumatic, Normocephalic  NECK: Supple and normal thyroid.  No JVD or carotid bruit.   HEART: S1, S2 regular , 1/6 soft ejection systolic murmur in mitral area , no thrill and no gallops .  PULMONARY: Bilateral vesicular breathing , few scattered rhonchi and few scattered rales are present .  ABDOMEN: Soft nontender and positive bowl sounds   EXTREMITIES:  No clubbing, cyanosis, or pedal  edema  NEUROLOGICAL: AAOX3 , no focal deficit .  Skin : No rashes .  Musculoskeletal : MNo joint swellings .    LABS:                        12.8   6.58  )-----------( 317      ( 06 Oct 2020 05:38 )             35.9     10-06    142  |  107  |  10  ----------------------------<  103<H>  3.8   |  27  |  0.75    Ca    8.8      06 Oct 2020 05:38    TPro  7.2  /  Alb  3.0<L>  /  TBili  0.4  /  DBili  .10  /  AST  18  /  ALT  34  /  AlkPhos  83  10-06          Assessment/Plan  Patient has :  1) Epigastric pain . Acute pancreatitis and better .  2) Pancreatic cyst .  3) Mild MR and Mild TR on echo .  Plan : 1) Cardiac stable so far . 2) Continue I/V hydration 3) Monitor electrolytes .  Will continue to follow during hospital course and give further recommendations as needed . Thanks for your referral . if any questions please contact me at office ([phone])cell [phone])

## 2020-10-06 NOTE — CONSULT NOTE ADULT - SUBJECTIVE AND OBJECTIVE BOX
Patient is a 50y old  Female who presents with a chief complaint of abdominal pain (05 Oct 2020 23:52)      Reason For Consult: dm2 uncontrolled    HPI:  49 yo female with  hx of cholecystectomy presents to er with cc c/o 3 days of abdominal pain. Started pain is located mostly  in epigastric area but and also is  spreading across upper abdomen and  to back. Some nausea, no vomiting. No fever.  No urinary c/o. Patient has a lap band for many years. Unsure if there is any fluid in it. Scheduled to have it removed Past med hx is significant for Blighted ovum  ,DM (diabetes mellitus)  ,Hypothyroid  ,Latex sensitivity  ,Missed   ,Obesity. ,H/O laparoscopic adjustable gastric banding  ,H/O:   ,History of cholecystectomy  ,S/P LASIK surgery. Found to have pancreatic enzymes elevation and diagnosed with acute pancreatitis .Admitted for pain management ,iv hydration and GI EVALUATION .Patient was initally presented to [place] ER and CT abdomen was done ,demonstrated MILD PERIPANCREATIC INFLAMMATORY CHANGES SURROUNDING THE PANCREATIC BODY AND TAIL REFLECTIVE OF MILD INTERSTITIAL PANCREATITIS .WELL-DEFINED CYSTIC FLUID COLLECTION WITHOUT RIM ENHANCEMENT IN THE PANCREATIC BODY MEASURING UP TO 2.8 X 2.4 CM WHICH MAY REPRESENT A PSEUDOCYST .Surg evaluation requested . (02 Oct 2020 07:35)      PAST MEDICAL & SURGICAL HISTORY:  Obesity    Missed     Latex sensitivity    DM (diabetes mellitus)    Blighted ovum    Hypothyroid    History of hypothyroidism    Morbid obesity due to excess calories  BMI 38.1    Latex Sensitivity    Blighted Ovum    Obesity    Missed     Diabetes Mellitus Type II    H/O laparoscopic adjustable gastric banding    S/P LASIK surgery    History of cholecystectomy    H/O:     History of cervical spinal surgery  fusion, anterior approach    Status Post Gastric Banding      Status Post Eye Surgery  lasik    S/P  Section  x 2    S/P Cholecystectomy        FAMILY HISTORY:  FH: stroke  father    FH: type 2 diabetes  both parents          Social History:    MEDICATIONS  (STANDING):  dextrose 5%. 1000 milliLiter(s) (50 mL/Hr) IV Continuous <Continuous>  dextrose 50% Injectable 12.5 Gram(s) IV Push once  dextrose 50% Injectable 25 Gram(s) IV Push once  dextrose 50% Injectable 25 Gram(s) IV Push once  enoxaparin Injectable 40 milliGRAM(s) SubCutaneous daily  insulin lispro (HumaLOG) corrective regimen sliding scale   SubCutaneous Before meals and at bedtime  levothyroxine 50 MICROGram(s) Oral daily  melatonin 5 milliGRAM(s) Oral at bedtime  pantoprazole    Tablet 40 milliGRAM(s) Oral before breakfast  polyethylene glycol 3350 17 Gram(s) Oral daily  senna 2 Tablet(s) Oral at bedtime  sodium chloride 0.9%. 1000 milliLiter(s) (75 mL/Hr) IV Continuous <Continuous>    MEDICATIONS  (PRN):  acetaminophen   Tablet .. 650 milliGRAM(s) Oral every 6 hours PRN Temp greater or equal to 38C (100.4F), Mild Pain (1 - 3)  dextrose 40% Gel 15 Gram(s) Oral once PRN Blood Glucose LESS THAN 70 milliGRAM(s)/deciliter  glucagon  Injectable 1 milliGRAM(s) IntraMuscular once PRN Glucose LESS THAN 70 milligrams/deciliter  morphine  - Injectable 2 milliGRAM(s) IV Push every 4 hours PRN Moderate Pain (4 - 6)  morphine  - Injectable 4 milliGRAM(s) IV Push every 6 hours PRN Severe Pain (7 - 10)  ondansetron Injectable 4 milliGRAM(s) IV Push every 6 hours PRN Nausea and/or Vomiting        T(C): 36.7 (10-06-20 @ 04:57), Max: 37.3 (10-05-20 @ 21:36)  HR: 77 (10-06-20 @ 04:57) (77 - 86)  BP: 117/82 (10-06-20 @ 04:57) (117/82 - 129/80)  RR: 16 (10-06-20 @ 04:57) (16 - 18)  SpO2: 95% (10-06-20 @ 04:57) (95% - 97%)  Wt(kg): --    PHYSICAL EXAM:  GENERAL: NAD, well-groomed, well-developed  HEAD:  Atraumatic, Normocephalic  NECK: Supple, No JVD, Normal thyroid  CHEST/LUNG: Clear to percussion bilaterally; No rales, rhonchi, wheezing, or rubs  HEART: Regular rate and rhythm; No murmurs, rubs, or gallops  ABDOMEN: Soft, Nontender, Nondistended; Bowel sounds present  EXTREMITIES:  2+ Peripheral Pulses, No clubbing, cyanosis, or edema  SKIN: No rashes or lesions    CAPILLARY BLOOD GLUCOSE      POCT Blood Glucose.: 120 mg/dL (06 Oct 2020 07:41)  POCT Blood Glucose.: 138 mg/dL (05 Oct 2020 21:46)  POCT Blood Glucose.: 106 mg/dL (05 Oct 2020 16:31)  POCT Blood Glucose.: 106 mg/dL (05 Oct 2020 11:27)                            12.8   6.58  )-----------( 317      ( 06 Oct 2020 05:38 )             35.9       CMP:  10-06 @ 05:38  SGPT 34  Albumin 3.0   Alk Phos 83   Anion Gap 8   SGOT 18   Total Bili 0.4   BUN 10   Calcium Total 8.8   CO2 27   Chloride 107   Creatinine 0.75   eGFR if    eGFR if non AA 94   Glucose 103   Potassium 3.8   Protein 7.2   Sodium 142      Thyroid Function Tests:      Diabetes Tests:       Radiology:

## 2020-10-06 NOTE — PROGRESS NOTE ADULT - SUBJECTIVE AND OBJECTIVE BOX
INTERVAL HPI/OVERNIGHT EVENTS:  feels better    MEDICATIONS  (STANDING):  dextrose 5%. 1000 milliLiter(s) (50 mL/Hr) IV Continuous <Continuous>  dextrose 50% Injectable 12.5 Gram(s) IV Push once  dextrose 50% Injectable 25 Gram(s) IV Push once  dextrose 50% Injectable 25 Gram(s) IV Push once  enoxaparin Injectable 40 milliGRAM(s) SubCutaneous daily  insulin lispro (HumaLOG) corrective regimen sliding scale   SubCutaneous Before meals and at bedtime  levothyroxine 50 MICROGram(s) Oral daily  melatonin 5 milliGRAM(s) Oral at bedtime  pantoprazole    Tablet 40 milliGRAM(s) Oral before breakfast  polyethylene glycol 3350 17 Gram(s) Oral daily  senna 2 Tablet(s) Oral at bedtime  sodium chloride 0.9%. 1000 milliLiter(s) (75 mL/Hr) IV Continuous <Continuous>    MEDICATIONS  (PRN):  acetaminophen   Tablet .. 650 milliGRAM(s) Oral every 6 hours PRN Temp greater or equal to 38C (100.4F), Mild Pain (1 - 3)  dextrose 40% Gel 15 Gram(s) Oral once PRN Blood Glucose LESS THAN 70 milliGRAM(s)/deciliter  glucagon  Injectable 1 milliGRAM(s) IntraMuscular once PRN Glucose LESS THAN 70 milligrams/deciliter  morphine  - Injectable 2 milliGRAM(s) IV Push every 4 hours PRN Moderate Pain (4 - 6)  morphine  - Injectable 4 milliGRAM(s) IV Push every 6 hours PRN Severe Pain (7 - 10)  ondansetron Injectable 4 milliGRAM(s) IV Push every 6 hours PRN Nausea and/or Vomiting      Allergies    latex (Other)  latex (Rash)  Novocain (Rash)    Intolerances        Review of Systems:    General:  No wt loss, fevers, chills, night sweats,fatigue,   Eyes:  Good vision, no reported pain  ENT:  No sore throat, pain, runny nose, dysphagia  CV:  No pain, palpitatioins, hypo/hypertension  Resp:  No dyspnea, cough, tachypnea, wheezing  GI:  No pain, No nausea, No vomiting, No diarrhea, No constipatiion, No weight loss, No fever, No pruritis, No rectal bleeding, No tarry stools, No dysphagia,  :  No pain, bleeding, incontinence, nocturia  Muscle:  No pain, weakness  Neuro:  No weakness, tingling, memory problems  Psych:  No fatigue, insomnia, mood problems, depression  Endocrine:  No polyuria, polydypsia, cold/heat intolerance  Heme:  No petechiae, ecchymosis, easy bruisability  Skin:  No rash, tattoos, scars, edema      Vital Signs Last 24 Hrs  T(C): 36.6 (06 Oct 2020 13:16), Max: 37.3 (05 Oct 2020 21:36)  T(F): 97.9 (06 Oct 2020 13:16), Max: 99.2 (05 Oct 2020 21:36)  HR: 94 (06 Oct 2020 13:16) (77 - 94)  BP: 108/78 (06 Oct 2020 13:16) (108/78 - 125/84)  BP(mean): --  RR: 16 (06 Oct 2020 13:16) (16 - 18)  SpO2: 94% (06 Oct 2020 13:16) (94% - 95%)    PHYSICAL EXAM:    Constitutional: NAD, well-developed  HEENT: EOMI, throat clear  Neck: No LAD, supple  Respiratory: CTA and P  Cardiovascular: S1 and S2, RRR, no M  Gastrointestinal: BS+, soft, NT/ND, neg HSM,  Extremities: No peripheral edema, neg clubing, cyanosis  Vascular: 2+ peripheral pulses  Neurological: A/O x 3, no focal deficits  Psychiatric: Normal mood, normal affect  Skin: No rashes      LABS:                        12.8   6.58  )-----------( 317      ( 06 Oct 2020 05:38 )             35.9     10-06    142  |  107  |  10  ----------------------------<  103<H>  3.8   |  27  |  0.75    Ca    8.8      06 Oct 2020 05:38    TPro  7.2  /  Alb  3.0<L>  /  TBili  0.4  /  DBili  .10  /  AST  18  /  ALT  34  /  AlkPhos  83  10-06          RADIOLOGY & ADDITIONAL TESTS:

## 2020-10-06 NOTE — DISCHARGE NOTE PROVIDER - PROVIDER TOKENS
PROVIDER:[TOKEN:[[id number]],FOLLOWUP:[1 week]] PROVIDER:[TOKEN:[[id number]],FOLLOWUP:[1 week]],PROVIDER:[TOKEN:[[id number]],FOLLOWUP:[1 month]] PROVIDER:[TOKEN:[[id number]],FOLLOWUP:[1 week]],PROVIDER:[TOKEN:[[id number]],FOLLOWUP:[1 month]],PROVIDER:[TOKEN:[[id number]],FOLLOWUP:[1 month]]

## 2020-10-06 NOTE — DISCHARGE NOTE PROVIDER - NSDCCPCAREPLAN_GEN_ALL_CORE_FT
PRINCIPAL DISCHARGE DIAGNOSIS  Diagnosis: Acute pancreatitis  Assessment and Plan of Treatment: Acute pancreatitis      SECONDARY DISCHARGE DIAGNOSES  Diagnosis: Pancreatic cyst  Assessment and Plan of Treatment: Pancreatic cyst    Diagnosis: Dyspepsia  Assessment and Plan of Treatment: Dyspepsia    Diagnosis: Diabetes mellitus type 2, uncontrolled  Assessment and Plan of Treatment: Diabetes mellitus type 2, uncontrolled    Diagnosis: Hypothyroid  Assessment and Plan of Treatment: Hypothyroid    Diagnosis: Obesity  Assessment and Plan of Treatment: Obesity     PRINCIPAL DISCHARGE DIAGNOSIS  Diagnosis: Acute pancreatitis  Assessment and Plan of Treatment: idiopathic      SECONDARY DISCHARGE DIAGNOSES  Diagnosis: Pancreatic cyst  Assessment and Plan of Treatment: Pancreatic cyst    Diagnosis: Dyspepsia  Assessment and Plan of Treatment: Dyspepsia    Diagnosis: Diabetes mellitus type 2, uncontrolled  Assessment and Plan of Treatment: Diabetes mellitus type 2, uncontrolled    Diagnosis: Hypothyroid  Assessment and Plan of Treatment: Hypothyroid

## 2020-10-06 NOTE — DISCHARGE NOTE PROVIDER - NSDCMRMEDTOKEN_GEN_ALL_CORE_FT
acetaminophen 325 mg oral tablet: 2 tab(s) orally every 6 hours, As needed, Temp greater or equal to 38C (100.4F), Mild Pain (1 - 3)  levothyroxine 50 mcg (0.05 mg) oral tablet: 1 tab(s) orally once a day  melatonin 5 mg oral tablet: 1 tab(s) orally once a day (at bedtime) OTC PRN INSOMNIA  pantoprazole 40 mg oral delayed release tablet: 1 tab(s) orally once a day (before a meal)  senna oral tablet: 2 tab(s) orally once a day (at bedtime) OTC  traMADol 50 mg oral tablet: 1 tab(s) orally every 6 hours, As needed, Moderate Pain (4 - 6) MDD:4 tabs   acetaminophen 325 mg oral tablet: 2 tab(s) orally every 6 hours, As needed, Temp greater or equal to 38C (100.4F), Mild Pain (1 - 3)  levothyroxine 75 mcg (0.075 mg) oral tablet: 1 tab(s) orally once a day  melatonin 5 mg oral tablet: 1 tab(s) orally once a day (at bedtime) OTC PRN INSOMNIA  pantoprazole 40 mg oral delayed release tablet: 1 tab(s) orally once a day (before a meal)  senna oral tablet: 2 tab(s) orally once a day (at bedtime) OTC  simethicone 80 mg oral tablet, chewable: 1 tab(s) orally 4 times a day, As needed, Gas  traMADol 50 mg oral tablet: 1 tab(s) orally every 6 hours, As needed, Moderate Pain (4 - 6) MDD:4 tabs

## 2020-10-06 NOTE — PROGRESS NOTE ADULT - PROBLEM SELECTOR PLAN 3
ON  SYNTHROID ON  SYNTHROID ,SPOKE TO DR.PERLMAN ,OK TO D/C HOME BACK ON 75 MCG SINCE LOW TSH LIKELY WAS RELATED TO PANCREATITIS ,OUTPATIENT FOLLOWUP IN THE OFFICE OUTPATIENT WORKUP IN GI OFFICE , spoke to dr.J Hanley ,discharge on pantoprazole and mylicon prn

## 2020-10-06 NOTE — DISCHARGE NOTE PROVIDER - HOSPITAL COURSE
s50 yo female with  hx of cholecystectomy presents to er with cc c/o 3 days of abdominal pain. Started pain is located mostly  in epigastric area but and also is  spreading across upper abdomen and  to back. Some nausea, no vomiting. No fever.  No urinary c/o. Patient has a lap band for many years. Unsure if there is any fluid in it. Scheduled to have it removed Past med hx is significant for Blighted ovum  ,DM (diabetes mellitus)  ,Hypothyroid  ,Latex sensitivity  ,Missed   ,Obesity. ,H/O laparoscopic adjustable gastric banding  ,H/O:   ,History of cholecystectomy  ,S/P LASIK surgery. Found to have pancreatic enzymes elevation and diagnosed with acute pancreatitis .Admitted for pain management ,iv hydration and GI EVALUATION .Patient was initally presented to [place] ER and CT abdomen was done ,demonstrated MILD PERIPANCREATIC INFLAMMATORY CHANGES SURROUNDING THE PANCREATIC BODY AND TAIL REFLECTIVE OF MILD INTERSTITIAL PANCREATITIS .WELL-DEFINED CYSTIC FLUID COLLECTION WITHOUT RIM ENHANCEMENT IN THE PANCREATIC BODY MEASURING UP TO 2.8 X 2.4 CM WHICH MAY REPRESENT A PSEUDOCYST .Surg evaluation requested .    Problem/Plan - 1:  ·  Problem: Acute pancreatitis.  Plan: RESOLVING  , ,PAIN MANAGEMENT , GI CONSULT ,SERIAL AMYLASE AND LIPASE .ENDOSCOPIC US WAS RECOMMENDED BY GI AS OUTPATIENT FOR CYST EVALUATION ,PATIENT IS AWARE OF PLAN.     Problem/Plan - 2:  ·  Problem: Pancreatic cyst.  Plan: GI AND SURGERY IS FOLLOWING.     Problem/Plan - 3:  ·  Problem: Hypothyroid.  Plan: ON  SYNTHROID.     Problem/Plan - 4:  ·  Problem: DM (diabetes mellitus).  Plan: CORRECTIVE REGIMEN SLIDING SCALE.     Problem/Plan - 5:  ·  Problem: Obesity.  Plan: PATIENT IS COMPLAINT WITH DIET HEALTHY CHOICES ,SEEN BY ENDOCRINOLOGIST AND OUTPATIENT FOLLOWUP IS RECOMMENDED.     Problem/Plan - 6:  Problem: Prophylactic measure. Plan: Gastrointestinal stress ulcer prophylaxis and DVT prophylaxis administered.

## 2020-10-06 NOTE — PROGRESS NOTE ADULT - PROBLEM SELECTOR PLAN 6
Gastrointestinal stress ulcer prophylaxis and DVT prophylaxis administered PATIENT IS COMPLAINT WITH DIET HEALTHY CHOICES ,SEEN BY ENDOCRINOLOGIST AND OUTPATIENT FOLLOWUP IS RECOMMENDED

## 2020-10-06 NOTE — PROGRESS NOTE ADULT - SUBJECTIVE AND OBJECTIVE BOX
pt seen  slight pain in back after eating  ICU Vital Signs Last 24 Hrs  T(C): 36.6 (06 Oct 2020 13:16), Max: 37.3 (05 Oct 2020 21:36)  T(F): 97.9 (06 Oct 2020 13:16), Max: 99.2 (05 Oct 2020 21:36)  HR: 94 (06 Oct 2020 13:16) (77 - 94)  BP: 108/78 (06 Oct 2020 13:16) (108/78 - 125/84)  BP(mean): --  ABP: --  ABP(mean): --  RR: 16 (06 Oct 2020 13:16) (16 - 18)  SpO2: 94% (06 Oct 2020 13:16) (94% - 95%)  gen-NAD  resp-clear  abd-soft NT/ND                        12.8   6.58  )-----------( 317      ( 06 Oct 2020 05:38 )             35.9   10-06    142  |  107  |  10  ----------------------------<  103<H>  3.8   |  27  |  0.75    Ca    8.8      06 Oct 2020 05:38    TPro  7.2  /  Alb  3.0<L>  /  TBili  0.4  /  DBili  .10  /  AST  18  /  ALT  34  /  AlkPhos  83  10-06

## 2020-10-06 NOTE — CONSULT NOTE ADULT - PROBLEM SELECTOR RECOMMENDATION 9
cont humalog scale coverage alone  cont lifestyle modifications alone upon d/c  pt to f/u in outpt setting

## 2020-10-07 ENCOUNTER — TRANSCRIPTION ENCOUNTER (OUTPATIENT)
Age: 50
End: 2020-10-07

## 2020-10-07 VITALS
SYSTOLIC BLOOD PRESSURE: 108 MMHG | HEART RATE: 80 BPM | RESPIRATION RATE: 16 BRPM | DIASTOLIC BLOOD PRESSURE: 95 MMHG | OXYGEN SATURATION: 96 % | TEMPERATURE: 98 F

## 2020-10-07 LAB
AMYLASE P1 CFR SERPL: 45 U/L — SIGNIFICANT CHANGE UP (ref 25–125)
ANA TITR SER: NEGATIVE — SIGNIFICANT CHANGE UP
ANION GAP SERPL CALC-SCNC: 7 MMOL/L — SIGNIFICANT CHANGE UP (ref 5–17)
BUN SERPL-MCNC: 12 MG/DL — SIGNIFICANT CHANGE UP (ref 7–23)
CALCIUM SERPL-MCNC: 9.1 MG/DL — SIGNIFICANT CHANGE UP (ref 8.5–10.1)
CHLORIDE SERPL-SCNC: 109 MMOL/L — HIGH (ref 96–108)
CO2 SERPL-SCNC: 27 MMOL/L — SIGNIFICANT CHANGE UP (ref 22–31)
CREAT SERPL-MCNC: 0.83 MG/DL — SIGNIFICANT CHANGE UP (ref 0.5–1.3)
CULTURE RESULTS: SIGNIFICANT CHANGE UP
GLUCOSE SERPL-MCNC: 114 MG/DL — HIGH (ref 70–99)
HCT VFR BLD CALC: 38 % — SIGNIFICANT CHANGE UP (ref 34.5–45)
HGB BLD-MCNC: 13.3 G/DL — SIGNIFICANT CHANGE UP (ref 11.5–15.5)
LIDOCAIN IGE QN: 278 U/L — SIGNIFICANT CHANGE UP (ref 73–393)
MCHC RBC-ENTMCNC: 29.8 PG — SIGNIFICANT CHANGE UP (ref 27–34)
MCHC RBC-ENTMCNC: 35 GM/DL — SIGNIFICANT CHANGE UP (ref 32–36)
MCV RBC AUTO: 85 FL — SIGNIFICANT CHANGE UP (ref 80–100)
NRBC # BLD: 0 /100 WBCS — SIGNIFICANT CHANGE UP (ref 0–0)
PLATELET # BLD AUTO: 344 K/UL — SIGNIFICANT CHANGE UP (ref 150–400)
POTASSIUM SERPL-MCNC: 3.9 MMOL/L — SIGNIFICANT CHANGE UP (ref 3.5–5.3)
POTASSIUM SERPL-SCNC: 3.9 MMOL/L — SIGNIFICANT CHANGE UP (ref 3.5–5.3)
RBC # BLD: 4.47 M/UL — SIGNIFICANT CHANGE UP (ref 3.8–5.2)
RBC # FLD: 12.7 % — SIGNIFICANT CHANGE UP (ref 10.3–14.5)
SODIUM SERPL-SCNC: 143 MMOL/L — SIGNIFICANT CHANGE UP (ref 135–145)
SPECIMEN SOURCE: SIGNIFICANT CHANGE UP
WBC # BLD: 5.63 K/UL — SIGNIFICANT CHANGE UP (ref 3.8–10.5)
WBC # FLD AUTO: 5.63 K/UL — SIGNIFICANT CHANGE UP (ref 3.8–10.5)

## 2020-10-07 PROCEDURE — 83036 HEMOGLOBIN GLYCOSYLATED A1C: CPT

## 2020-10-07 PROCEDURE — 80053 COMPREHEN METABOLIC PANEL: CPT

## 2020-10-07 PROCEDURE — 83735 ASSAY OF MAGNESIUM: CPT

## 2020-10-07 PROCEDURE — 87040 BLOOD CULTURE FOR BACTERIA: CPT

## 2020-10-07 PROCEDURE — 36415 COLL VENOUS BLD VENIPUNCTURE: CPT

## 2020-10-07 PROCEDURE — 84443 ASSAY THYROID STIM HORMONE: CPT

## 2020-10-07 PROCEDURE — 76705 ECHO EXAM OF ABDOMEN: CPT

## 2020-10-07 PROCEDURE — 99053 MED SERV 10PM-8AM 24 HR FAC: CPT

## 2020-10-07 PROCEDURE — 82787 IGG 1 2 3 OR 4 EACH: CPT

## 2020-10-07 PROCEDURE — 80048 BASIC METABOLIC PNL TOTAL CA: CPT

## 2020-10-07 PROCEDURE — 85027 COMPLETE CBC AUTOMATED: CPT

## 2020-10-07 PROCEDURE — U0003: CPT

## 2020-10-07 PROCEDURE — 96374 THER/PROPH/DIAG INJ IV PUSH: CPT

## 2020-10-07 PROCEDURE — 80076 HEPATIC FUNCTION PANEL: CPT

## 2020-10-07 PROCEDURE — 80061 LIPID PANEL: CPT

## 2020-10-07 PROCEDURE — 86038 ANTINUCLEAR ANTIBODIES: CPT

## 2020-10-07 PROCEDURE — 83690 ASSAY OF LIPASE: CPT

## 2020-10-07 PROCEDURE — 82150 ASSAY OF AMYLASE: CPT

## 2020-10-07 PROCEDURE — 93005 ELECTROCARDIOGRAM TRACING: CPT

## 2020-10-07 PROCEDURE — A9579: CPT

## 2020-10-07 PROCEDURE — 74183 MRI ABD W/O CNTR FLWD CNTR: CPT

## 2020-10-07 PROCEDURE — 85025 COMPLETE CBC W/AUTO DIFF WBC: CPT

## 2020-10-07 PROCEDURE — 85610 PROTHROMBIN TIME: CPT

## 2020-10-07 PROCEDURE — 82962 GLUCOSE BLOOD TEST: CPT

## 2020-10-07 PROCEDURE — 93306 TTE W/DOPPLER COMPLETE: CPT

## 2020-10-07 PROCEDURE — 99285 EMERGENCY DEPT VISIT HI MDM: CPT | Mod: 25

## 2020-10-07 PROCEDURE — 84100 ASSAY OF PHOSPHORUS: CPT

## 2020-10-07 PROCEDURE — 86769 SARS-COV-2 COVID-19 ANTIBODY: CPT

## 2020-10-07 PROCEDURE — 84478 ASSAY OF TRIGLYCERIDES: CPT

## 2020-10-07 PROCEDURE — 83605 ASSAY OF LACTIC ACID: CPT

## 2020-10-07 RX ADMIN — PANTOPRAZOLE SODIUM 40 MILLIGRAM(S): 20 TABLET, DELAYED RELEASE ORAL at 06:16

## 2020-10-07 RX ADMIN — Medication 50 MICROGRAM(S): at 06:16

## 2020-10-07 NOTE — PROGRESS NOTE ADULT - PROBLEM SELECTOR PLAN 4
ON  SYNTHROID ,SPOKE TO DR.PERLMAN ,OK TO D/C HOME BACK ON 75 MCG SINCE LOW TSH LIKELY WAS RELATED TO PANCREATITIS ,OUTPATIENT FOLLOWUP IN THE OFFICE

## 2020-10-07 NOTE — PROGRESS NOTE ADULT - ASSESSMENT
49 yo female with  hx of cholecystectomy presents to er with cc c/o 3 days of abdominal pain. Started pain is located mostly  in epigastric area but and also is  spreading across upper abdomen and  to back. Some nausea, no vomiting. No fever.  No urinary c/o. Patient has a lap band for many years. Unsure if there is any fluid in it. Scheduled to have it removed Past med hx is significant for Blighted ovum  ,DM (diabetes mellitus)  ,Hypothyroid  ,Latex sensitivity  ,Missed   ,Obesity. ,H/O laparoscopic adjustable gastric banding  ,H/O:   ,History of cholecystectomy  ,S/P LASIK surgery. Found to have pancreatic enzymes elevation and diagnosed with acute pancreatitis .Admitted for pain management ,iv hydration and GI EVALUATION .Patient was initally presented to [place] ER and CT abdomen was done ,demonstrated MILD PERIPANCREATIC INFLAMMATORY CHANGES SURROUNDING THE PANCREATIC BODY AND TAIL REFLECTIVE OF MILD INTERSTITIAL PANCREATITIS .WELL-DEFINED CYSTIC FLUID COLLECTION WITHOUT RIM ENHANCEMENT IN THE PANCREATIC BODY MEASURING UP TO 2.8 X 2.4 CM WHICH MAY REPRESENT A PSEUDOCYST .Surg evaluation requested .
49 yo female with  hx of cholecystectomy presents to er with cc c/o 3 days of abdominal pain. Started pain is located mostly  in epigastric area but and also is  spreading across upper abdomen and  to back. Some nausea, no vomiting. No fever.  No urinary c/o. Patient has a lap band for many years. Unsure if there is any fluid in it. Scheduled to have it removed Past med hx is significant for Blighted ovum  ,DM (diabetes mellitus)  ,Hypothyroid  ,Latex sensitivity  ,Missed   ,Obesity. ,H/O laparoscopic adjustable gastric banding  ,H/O:   ,History of cholecystectomy  ,S/P LASIK surgery. Found to have pancreatic enzymes elevation and diagnosed with acute pancreatitis .Admitted for pain management ,iv hydration and GI EVALUATION .Patient was initally presented to [place] ER and CT abdomen was done ,demonstrated MILD PERIPANCREATIC INFLAMMATORY CHANGES SURROUNDING THE PANCREATIC BODY AND TAIL REFLECTIVE OF MILD INTERSTITIAL PANCREATITIS .WELL-DEFINED CYSTIC FLUID COLLECTION WITHOUT RIM ENHANCEMENT IN THE PANCREATIC BODY MEASURING UP TO 2.8 X 2.4 CM WHICH MAY REPRESENT A PSEUDOCYST .Surg evaluation requested .
49 yo female with  hx of cholecystectomy presents to er with cc c/o 3 days of abdominal pain. Started pain is located mostly  in epigastric area but and also is  spreading across upper abdomen and  to back. Some nausea, no vomiting. No fever.  No urinary c/o. Patient has a lap band for many years. Unsure if there is any fluid in it. Scheduled to have it removed Past med hx is significant for Blighted ovum  ,DM (diabetes mellitus)  ,Hypothyroid  ,Latex sensitivity  ,Missed   ,Obesity. ,H/O laparoscopic adjustable gastric banding  ,H/O:   ,History of cholecystectomy  ,S/P LASIK surgery. Found to have pancreatic enzymes elevation and diagnosed with acute pancreatitis .Admitted for pain management ,iv hydration and GI EVALUATION .Patient was initally presented to [place] ER and CT abdomen was done ,demonstrated MILD PERIPANCREATIC INFLAMMATORY CHANGES SURROUNDING THE PANCREATIC BODY AND TAIL REFLECTIVE OF MILD INTERSTITIAL PANCREATITIS .WELL-DEFINED CYSTIC FLUID COLLECTION WITHOUT RIM ENHANCEMENT IN THE PANCREATIC BODY MEASURING UP TO 2.8 X 2.4 CM WHICH MAY REPRESENT A PSEUDOCYST .Surg evaluation requested .
49 yo female with  hx of cholecystectomy presents to er with cc c/o 3 days of abdominal pain. Started pain is located mostly  in epigastric area but and also is  spreading across upper abdomen and  to back. Some nausea, no vomiting. No fever.  No urinary c/o. Patient has a lap band for many years. Unsure if there is any fluid in it. Scheduled to have it removed Past med hx is significant for Blighted ovum  ,DM (diabetes mellitus)  ,Hypothyroid  ,Latex sensitivity  ,Missed   ,Obesity. ,H/O laparoscopic adjustable gastric banding  ,H/O:   ,History of cholecystectomy  ,S/P LASIK surgery. Found to have pancreatic enzymes elevation and diagnosed with acute pancreatitis .Admitted for pain management ,iv hydration and GI EVALUATION .Patient was initally presented to [place] ER and CT abdomen was done ,demonstrated MILD PERIPANCREATIC INFLAMMATORY CHANGES SURROUNDING THE PANCREATIC BODY AND TAIL REFLECTIVE OF MILD INTERSTITIAL PANCREATITIS .WELL-DEFINED CYSTIC FLUID COLLECTION WITHOUT RIM ENHANCEMENT IN THE PANCREATIC BODY MEASURING UP TO 2.8 X 2.4 CM WHICH MAY REPRESENT A PSEUDOCYST .Surg evaluation requested .
49 yo with pancreatitis.      full liquid diet     gi following
51 yo female with  hx of cholecystectomy presents to er with cc c/o 3 days of abdominal pain. Started pain is located mostly  in epigastric area but and also is  spreading across upper abdomen and  to back. Some nausea, no vomiting. No fever.  No urinary c/o. Patient has a lap band for many years. Unsure if there is any fluid in it. Scheduled to have it removed Past med hx is significant for Blighted ovum  ,DM (diabetes mellitus)  ,Hypothyroid  ,Latex sensitivity  ,Missed   ,Obesity. ,H/O laparoscopic adjustable gastric banding  ,H/O:   ,History of cholecystectomy  ,S/P LASIK surgery. Found to have pancreatic enzymes elevation and diagnosed with acute pancreatitis .Admitted for pain management ,iv hydration and GI EVALUATION .Patient was initally presented to [place] ER and CT abdomen was done ,demonstrated MILD PERIPANCREATIC INFLAMMATORY CHANGES SURROUNDING THE PANCREATIC BODY AND TAIL REFLECTIVE OF MILD INTERSTITIAL PANCREATITIS .WELL-DEFINED CYSTIC FLUID COLLECTION WITHOUT RIM ENHANCEMENT IN THE PANCREATIC BODY MEASURING UP TO 2.8 X 2.4 CM WHICH MAY REPRESENT A PSEUDOCYST .Surg evaluation requested .
51 yo with pancreatitis. Slight rise in lipase   cont clears for now    am labs
Resolving pancreatitis. Unsure if related to cyst       clear liquid diet
acute pancreatitis  ? cause ? gallstones vs sludge vs idiopathic    plan  clinically improving  all questions answered   dc  if tolerates diet
acute pancreatitis  ? cause ? gallstones vs sludge vs idiopathic    plan  clinically improving  all questions answered  gentle hydration today  possible dc  if tolerates diet
acute pancreatitis  ? cause ? gallstones vs sludge vs idiopathic    plan  clinically improving  all questions answered  gentle hydration today  possible dc tomorrow if tolerates diet
acute pancreatitis  ? cause ? gallstones vs sludge vs idiopathic    plan  in and out  check lfts  check amylase and lipase  ppi once a day  gerd precautions  adv diet slowly  
acute pancreatitis  ? cause ? gallstones vs sludge vs idiopathic    plan  in and out  check lfts  check amylase and lipase  ppi once a day  gerd precautions  adv diet slowly  
slowly resolving pancreatitis       cont reg diet       hopefully d/c home [facility]
51 yo with pancreatitis. Resolving      cleared for d/c

## 2020-10-07 NOTE — PROGRESS NOTE ADULT - SUBJECTIVE AND OBJECTIVE BOX
pt seen  feeling better  still some discomfort with regular food  ICU Vital Signs Last 24 Hrs  T(C): 36.5 (07 Oct 2020 04:13), Max: 36.9 (06 Oct 2020 21:34)  T(F): 97.7 (07 Oct 2020 04:13), Max: 98.4 (06 Oct 2020 21:34)  HR: 79 (07 Oct 2020 04:13) (79 - 94)  BP: 105/74 (07 Oct 2020 04:13) (105/74 - 115/79)  BP(mean): --  ABP: --  ABP(mean): --  RR: 16 (07 Oct 2020 04:13) (16 - 18)  SpO2: 94% (07 Oct 2020 04:13) (94% - 94%)  gen-NAD  resp-clear  abd-soft NT/ND                          13.3   5.63  )-----------( 344      ( 07 Oct 2020 06:32 )             38.0   10-07    143  |  109<H>  |  12  ----------------------------<  114<H>  3.9   |  27  |  0.83    Ca    9.1      07 Oct 2020 06:32    TPro  7.2  /  Alb  3.0<L>  /  TBili  0.4  /  DBili  .10  /  AST  18  /  ALT  34  /  AlkPhos  83  10-06

## 2020-10-07 NOTE — PROGRESS NOTE ADULT - SUBJECTIVE AND OBJECTIVE BOX
PROGRESS NOTE  Patient is a 50y old  Female who presents with a chief complaint of abdominal pain (07 Oct 2020 10:59)  Chart and available morning labs /imaging are reviewed electronically , urgent issues addressed . More information  is being added upon completion of rounds , when more information is collected and management discussed with consultants , medical staff and social service/case management on the floor   LATE ENTRY- patient was seen and examined earlier today . Plan of care was discussed with med staff and unit coordinator .   OVERNIGHT  No new issues reported by medical staff . All above noted Patient is resting in a bed comfortably . .No distress noted   Feels better  HPI:  49 yo female with  hx of cholecystectomy presents to er with cc c/o 3 days of abdominal pain. Started pain is located mostly  in epigastric area but and also is  spreading across upper abdomen and  to back. Some nausea, no vomiting. No fever.  No urinary c/o. Patient has a lap band for many years. Unsure if there is any fluid in it. Scheduled to have it removed Past med hx is significant for Blighted ovum  ,DM (diabetes mellitus)  ,Hypothyroid  ,Latex sensitivity  ,Missed   ,Obesity. ,H/O laparoscopic adjustable gastric banding  ,H/O:   ,History of cholecystectomy  ,S/P LASIK surgery. Found to have pancreatic enzymes elevation and diagnosed with acute pancreatitis .Admitted for pain management ,iv hydration and GI EVALUATION .Patient was initally presented to [place] ER and CT abdomen was done ,demonstrated MILD PERIPANCREATIC INFLAMMATORY CHANGES SURROUNDING THE PANCREATIC BODY AND TAIL REFLECTIVE OF MILD INTERSTITIAL PANCREATITIS .WELL-DEFINED CYSTIC FLUID COLLECTION WITHOUT RIM ENHANCEMENT IN THE PANCREATIC BODY MEASURING UP TO 2.8 X 2.4 CM WHICH MAY REPRESENT A PSEUDOCYST .Surg evaluation requested . (02 Oct 2020 07:35)    PAST MEDICAL & SURGICAL HISTORY:  Obesity    Missed     Latex sensitivity    DM (diabetes mellitus)    Blighted ovum    Hypothyroid    History of hypothyroidism    Morbid obesity due to excess calories  BMI 38.1    Latex Sensitivity    Blighted Ovum    Obesity    Missed     Diabetes Mellitus Type II    H/O laparoscopic adjustable gastric banding    S/P LASIK surgery    History of cholecystectomy    H/O:     History of cervical spinal surgery  fusion, anterior approach    Status Post Gastric Banding      Status Post Eye Surgery  lasik    S/P  Section  x 2    S/P Cholecystectomy        MEDICATIONS  (STANDING):  dextrose 5%. 1000 milliLiter(s) (50 mL/Hr) IV Continuous <Continuous>  dextrose 50% Injectable 12.5 Gram(s) IV Push once  dextrose 50% Injectable 25 Gram(s) IV Push once  dextrose 50% Injectable 25 Gram(s) IV Push once  enoxaparin Injectable 40 milliGRAM(s) SubCutaneous daily  insulin lispro (HumaLOG) corrective regimen sliding scale   SubCutaneous Before meals and at bedtime  levothyroxine 50 MICROGram(s) Oral daily  melatonin 5 milliGRAM(s) Oral at bedtime  pantoprazole    Tablet 40 milliGRAM(s) Oral before breakfast  polyethylene glycol 3350 17 Gram(s) Oral daily  senna 2 Tablet(s) Oral at bedtime    MEDICATIONS  (PRN):  acetaminophen   Tablet .. 650 milliGRAM(s) Oral every 6 hours PRN Temp greater or equal to 38C (100.4F), Mild Pain (1 - 3)  dextrose 40% Gel 15 Gram(s) Oral once PRN Blood Glucose LESS THAN 70 milliGRAM(s)/deciliter  glucagon  Injectable 1 milliGRAM(s) IntraMuscular once PRN Glucose LESS THAN 70 milligrams/deciliter  ondansetron Injectable 4 milliGRAM(s) IV Push every 6 hours PRN Nausea and/or Vomiting  simethicone 80 milliGRAM(s) Chew four times a day PRN Gas  traMADol 50 milliGRAM(s) Oral every 6 hours PRN Moderate Pain (4 - 6)      OBJECTIVE    T(C): 36.5 (10-07-20 @ 04:13), Max: 36.9 (10-06-20 @ 21:34)  HR: 79 (10-07-20 @ 04:13) (79 - 94)  BP: 105/74 (10-07-20 @ 04:13) (105/74 - 115/79)  RR: 16 (10-07-20 @ 04:13) (16 - 18)  SpO2: 94% (10-07-20 @ 04:13) (94% - 94%)  Wt(kg): --  I&O's Summary    06 Oct 2020 07:01  -  07 Oct 2020 07:00  --------------------------------------------------------  IN: 720 mL / OUT: 0 mL / NET: 720 mL          REVIEW OF SYSTEMS:  CONSTITUTIONAL: No fever, weight loss, or fatigue  EYES: No eye pain, visual disturbances, or discharge  ENMT:   No sinus or throat pain  NECK: No pain or stiffness  RESPIRATORY: No cough, wheezing, chills or hemoptysis; No shortness of breath  CARDIOVASCULAR: No chest pain, palpitations, dizziness, or leg swelling  GASTROINTESTINAL: No abdominal pain. No nausea, vomiting; No diarrhea or constipation. No melena or hematochezia.  GENITOURINARY: No dysuria, frequency, hematuria, or incontinence  NEUROLOGICAL: No headaches, memory loss, loss of strength, numbness, or tremors  SKIN: No itching, burning, rashes, or lesions   MUSCULOSKELETAL: No joint pain or swelling; No muscle, back, or extremity pain    PHYSICAL EXAM:  Appearance: NAD. VS past 24 hrs -as above   HEENT:   Moist oral mucosa. Conjunctiva clear b/l.   Neck : supple  Respiratory: Lungs CTAB.  Gastrointestinal:  Soft, nontender. No rebound. No rigidity. BS present	  Cardiovascular: RRR ,S1S2 present  Neurologic: Non-focal. Moving all extremities.  Extremities: No edema. No erythema. No calf tenderness.  Skin: No rashes, No ecchymoses, No cyanosis.	  wounds ,skin lesions-See skin assesment flow sheet   LABS:                        13.3   5.63  )-----------( 344      ( 07 Oct 2020 06:32 )             38.0     10-07    143  |  109<H>  |  12  ----------------------------<  114<H>  3.9   |  27  |  0.83    Ca    9.1      07 Oct 2020 06:32    TPro  7.2  /  Alb  3.0<L>  /  TBili  0.4  /  DBili  .10  /  AST  18  /  ALT  34  /  AlkPhos  83  10-06    CAPILLARY BLOOD GLUCOSE      POCT Blood Glucose.: 121 mg/dL (07 Oct 2020 07:22)  POCT Blood Glucose.: 154 mg/dL (06 Oct 2020 21:54)  POCT Blood Glucose.: 103 mg/dL (06 Oct 2020 16:38)  POCT Blood Glucose.: 94 mg/dL (06 Oct 2020 11:18)          Culture - Blood (collected 02 Oct 2020 14:52)  Source: .Blood Blood  Preliminary Report (03 Oct 2020 15:04):    No growth to date.      RADIOLOGY & ADDITIONAL TESTS:   reviewed elctronically  ASSESSMENT/PLAN: 	    Patient was seen and examined on a day of discharge . Plan of care , discharge medications and recommendations discussed with consultants and clearance for discharge obtained .Social service , case management  and medical staff are aware of plan. Family is notified. Discharge summary  is  prepared electronically 75minutes spent on this visit, 50% visit time spent in care co-ordination with other attendings and counselling patient  I have discussed care plan with patient who ,expressed understanding of problems treatment and their effect and side effects, alternatives in detail,I have asked if they have any questions and concerns and appropriately addressed them to best of my ability

## 2020-10-07 NOTE — DISCHARGE NOTE NURSING/CASE MANAGEMENT/SOCIAL WORK - PATIENT PORTAL LINK FT
You can access the FollowMyHealth Patient Portal offered by [facility] by registering at the following website: [email or web address]. By joining [facility]’s FollowMyHealth portal, you will also be able to view your health information using other applications (apps) compatible with our system.

## 2020-10-07 NOTE — DISCHARGE NOTE NURSING/CASE MANAGEMENT/SOCIAL WORK - NSDCFUADDAPPT_GEN_ALL_CORE_FT
1)Please schedule outpatient endoscopic US in GI office ,2)please obtain pancreatic enzymes test within one week after discharge ( in GI office )  , 3)abdominal MRI every 6 mnts for 2 years ,followup pancreatic cyst 4) call GI OFFICE to schedule appointment 5) followup with endocrinologist regarding DM ,hypothyroidism  and weight management

## 2020-10-07 NOTE — PROGRESS NOTE ADULT - ATTENDING COMMENTS
49 yo female with  hx of cholecystectomy presents to er with cc c/o 3 days of abdominal pain. Started pain is located mostly  in epigastric area but and also is  spreading across upper abdomen and  to back. Some nausea, no vomiting. No fever.  No urinary c/o. Patient has a lap band for many years. Unsure if there is any fluid in it. Scheduled to have it removed Past med hx is significant for Blighted ovum  ,DM (diabetes mellitus)  ,Hypothyroid  ,Latex sensitivity  ,Missed   ,Obesity. ,H/O laparoscopic adjustable gastric banding  ,H/O:   ,History of cholecystectomy  ,S/P LASIK surgery. Found to have pancreatic enzymes elevation and diagnosed with acute pancreatitis .Admitted for pain management ,iv hydration and GI EVALUATION.Patient was initally presented to [place] ER and CT abdomen was done ,demonstrated MILD PERIPANCREATIC INFLAMMATORY CHANGES SURROUNDING THE PANCREATIC BODY AND TAIL REFLECTIVE OF MILD INTERSTITIAL PANCREATITIS .WELL-DEFINED CYSTIC FLUID COLLECTION WITHOUT RIM ENHANCEMENT IN THE PANCREATIC BODY MEASURING UP TO 2.8 X 2.4 CM WHICH MAY REPRESENT A PSEUDOCYST .Surg evaluation requested .
49 yo female with  hx of cholecystectomy presents to er with cc c/o 3 days of abdominal pain. Started pain is located mostly  in epigastric area but and also is  spreading across upper abdomen and  to back. Some nausea, no vomiting. No fever.  No urinary c/o. Patient has a lap band for many years. Unsure if there is any fluid in it. Scheduled to have it removed Past med hx is significant for Blighted ovum  ,DM (diabetes mellitus)  ,Hypothyroid  ,Latex sensitivity  ,Missed   ,Obesity. ,H/O laparoscopic adjustable gastric banding  ,H/O:   ,History of cholecystectomy  ,S/P LASIK surgery. Found to have pancreatic enzymes elevation and diagnosed with acute pancreatitis .Admitted for pain management ,iv hydration and GI EVALUATION.Patient was initally presented to [place] ER and CT abdomen was done ,demonstrated MILD PERIPANCREATIC INFLAMMATORY CHANGES SURROUNDING THE PANCREATIC BODY AND TAIL REFLECTIVE OF MILD INTERSTITIAL PANCREATITIS .WELL-DEFINED CYSTIC FLUID COLLECTION WITHOUT RIM ENHANCEMENT IN THE PANCREATIC BODY MEASURING UP TO 2.8 X 2.4 CM WHICH MAY REPRESENT A PSEUDOCYST .Surg evaluation requested .
51 yo female with  hx of cholecystectomy presents to er with cc c/o 3 days of abdominal pain. Started pain is located mostly  in epigastric area but and also is  spreading across upper abdomen and  to back. Some nausea, no vomiting. No fever.  No urinary c/o. Patient has a lap band for many years. Unsure if there is any fluid in it. Scheduled to have it removed Past med hx is significant for Blighted ovum  ,DM (diabetes mellitus)  ,Hypothyroid  ,Latex sensitivity  ,Missed   ,Obesity. ,H/O laparoscopic adjustable gastric banding  ,H/O:   ,History of cholecystectomy  ,S/P LASIK surgery. Found to have pancreatic enzymes elevation and diagnosed with acute pancreatitis .Admitted for pain management ,iv hydration and GI EVALUATION.Patient was initally presented to [place] ER and CT abdomen was done ,demonstrated MILD PERIPANCREATIC INFLAMMATORY CHANGES SURROUNDING THE PANCREATIC BODY AND TAIL REFLECTIVE OF MILD INTERSTITIAL PANCREATITIS .WELL-DEFINED CYSTIC FLUID COLLECTION WITHOUT RIM ENHANCEMENT IN THE PANCREATIC BODY MEASURING UP TO 2.8 X 2.4 CM WHICH MAY REPRESENT A PSEUDOCYST .Surg evaluation requested .
51 yo female with  hx of cholecystectomy presents to er with cc c/o 3 days of abdominal pain. Started pain is located mostly  in epigastric area but and also is  spreading across upper abdomen and  to back. Some nausea, no vomiting. No fever.  No urinary c/o. Patient has a lap band for many years. Unsure if there is any fluid in it. Scheduled to have it removed Past med hx is significant for Blighted ovum  ,DM (diabetes mellitus)  ,Hypothyroid  ,Latex sensitivity  ,Missed   ,Obesity. ,H/O laparoscopic adjustable gastric banding  ,H/O:   ,History of cholecystectomy  ,S/P LASIK surgery. Found to have pancreatic enzymes elevation and diagnosed with acute pancreatitis .Admitted for pain management ,iv hydration and GI EVALUATION.Patient was initally presented to [place] ER and CT abdomen was done ,demonstrated MILD PERIPANCREATIC INFLAMMATORY CHANGES SURROUNDING THE PANCREATIC BODY AND TAIL REFLECTIVE OF MILD INTERSTITIAL PANCREATITIS .WELL-DEFINED CYSTIC FLUID COLLECTION WITHOUT RIM ENHANCEMENT IN THE PANCREATIC BODY MEASURING UP TO 2.8 X 2.4 CM WHICH MAY REPRESENT A PSEUDOCYST .Surg evaluation requested .
49 yo female with  hx of cholecystectomy presents to er with cc c/o 3 days of abdominal pain. Started pain is located mostly  in epigastric area but and also is  spreading across upper abdomen and  to back. Some nausea, no vomiting. No fever.  No urinary c/o. Patient has a lap band for many years. Unsure if there is any fluid in it. Scheduled to have it removed Past med hx is significant for Blighted ovum  ,DM (diabetes mellitus)  ,Hypothyroid  ,Latex sensitivity  ,Missed   ,Obesity. ,H/O laparoscopic adjustable gastric banding  ,H/O:   ,History of cholecystectomy  ,S/P LASIK surgery. Found to have pancreatic enzymes elevation and diagnosed with acute pancreatitis .Admitted for pain management ,iv hydration and GI EVALUATION.Patient was initally presented to [place] ER and CT abdomen was done ,demonstrated MILD PERIPANCREATIC INFLAMMATORY CHANGES SURROUNDING THE PANCREATIC BODY AND TAIL REFLECTIVE OF MILD INTERSTITIAL PANCREATITIS .WELL-DEFINED CYSTIC FLUID COLLECTION WITHOUT RIM ENHANCEMENT IN THE PANCREATIC BODY MEASURING UP TO 2.8 X 2.4 CM WHICH MAY REPRESENT A PSEUDOCYST .Surg evaluation requested .

## 2020-10-07 NOTE — PROGRESS NOTE ADULT - PROBLEM SELECTOR PLAN 6
PATIENT IS COMPLAINT WITH DIET HEALTHY CHOICES ,SEEN BY ENDOCRINOLOGIST AND OUTPATIENT FOLLOWUP IS RECOMMENDED

## 2020-10-07 NOTE — PROGRESS NOTE ADULT - SUBJECTIVE AND OBJECTIVE BOX
INTERVAL HPI/OVERNIGHT EVENTS:  had pain yesterday   better today    MEDICATIONS  (STANDING):  dextrose 5%. 1000 milliLiter(s) (50 mL/Hr) IV Continuous <Continuous>  dextrose 50% Injectable 12.5 Gram(s) IV Push once  dextrose 50% Injectable 25 Gram(s) IV Push once  dextrose 50% Injectable 25 Gram(s) IV Push once  enoxaparin Injectable 40 milliGRAM(s) SubCutaneous daily  insulin lispro (HumaLOG) corrective regimen sliding scale   SubCutaneous Before meals and at bedtime  levothyroxine 50 MICROGram(s) Oral daily  melatonin 5 milliGRAM(s) Oral at bedtime  pantoprazole    Tablet 40 milliGRAM(s) Oral before breakfast  polyethylene glycol 3350 17 Gram(s) Oral daily  senna 2 Tablet(s) Oral at bedtime    MEDICATIONS  (PRN):  acetaminophen   Tablet .. 650 milliGRAM(s) Oral every 6 hours PRN Temp greater or equal to 38C (100.4F), Mild Pain (1 - 3)  dextrose 40% Gel 15 Gram(s) Oral once PRN Blood Glucose LESS THAN 70 milliGRAM(s)/deciliter  glucagon  Injectable 1 milliGRAM(s) IntraMuscular once PRN Glucose LESS THAN 70 milligrams/deciliter  ondansetron Injectable 4 milliGRAM(s) IV Push every 6 hours PRN Nausea and/or Vomiting  simethicone 80 milliGRAM(s) Chew four times a day PRN Gas  traMADol 50 milliGRAM(s) Oral every 6 hours PRN Moderate Pain (4 - 6)      Allergies    latex (Other)  latex (Rash)  Novocain (Rash)    Intolerances        Review of Systems:    General:  No wt loss, fevers, chills, night sweats,fatigue,   Eyes:  Good vision, no reported pain  ENT:  No sore throat, pain, runny nose, dysphagia  CV:  No pain, palpitatioins, hypo/hypertension  Resp:  No dyspnea, cough, tachypnea, wheezing  GI:  No pain, No nausea, No vomiting, No diarrhea, No constipatiion, No weight loss, No fever, No pruritis, No rectal bleeding, No tarry stools, No dysphagia,  :  No pain, bleeding, incontinence, nocturia  Muscle:  No pain, weakness  Neuro:  No weakness, tingling, memory problems  Psych:  No fatigue, insomnia, mood problems, depression  Endocrine:  No polyuria, polydypsia, cold/heat intolerance  Heme:  No petechiae, ecchymosis, easy bruisability  Skin:  No rash, tattoos, scars, edema      Vital Signs Last 24 Hrs  T(C): 36.5 (07 Oct 2020 04:13), Max: 36.9 (06 Oct 2020 21:34)  T(F): 97.7 (07 Oct 2020 04:13), Max: 98.4 (06 Oct 2020 21:34)  HR: 79 (07 Oct 2020 04:13) (79 - 94)  BP: 105/74 (07 Oct 2020 04:13) (105/74 - 115/79)  BP(mean): --  RR: 16 (07 Oct 2020 04:13) (16 - 18)  SpO2: 94% (07 Oct 2020 04:13) (94% - 94%)    PHYSICAL EXAM:    Constitutional: NAD, well-developed  HEENT: EOMI, throat clear  Neck: No LAD, supple  Respiratory: CTA and P  Cardiovascular: S1 and S2, RRR, no M  Gastrointestinal: BS+, soft, NT/ND, neg HSM,  Extremities: No peripheral edema, neg clubing, cyanosis  Vascular: 2+ peripheral pulses  Neurological: A/O x 3, no focal deficits  Psychiatric: Normal mood, normal affect  Skin: No rashes      LABS:                        13.3   5.63  )-----------( 344      ( 07 Oct 2020 06:32 )             38.0     10-07    143  |  109<H>  |  12  ----------------------------<  114<H>  3.9   |  27  |  0.83    Ca    9.1      07 Oct 2020 06:32    TPro  7.2  /  Alb  3.0<L>  /  TBili  0.4  /  DBili  .10  /  AST  18  /  ALT  34  /  AlkPhos  83  10-06          RADIOLOGY & ADDITIONAL TESTS:

## 2020-10-19 PROBLEM — E11.9 TYPE 2 DIABETES MELLITUS WITHOUT COMPLICATIONS: Chronic | Status: ACTIVE | Noted: 2020-10-02

## 2020-10-19 PROBLEM — O02.1 MISSED ABORTION: Chronic | Status: ACTIVE | Noted: 2020-10-02

## 2020-10-19 PROBLEM — O02.0 BLIGHTED OVUM AND NONHYDATIDIFORM MOLE: Chronic | Status: ACTIVE | Noted: 2020-10-02

## 2020-10-19 PROBLEM — E66.9 OBESITY, UNSPECIFIED: Chronic | Status: ACTIVE | Noted: 2020-10-02

## 2020-10-19 PROBLEM — Z91.040 LATEX ALLERGY STATUS: Chronic | Status: ACTIVE | Noted: 2020-10-02

## 2020-10-19 PROBLEM — E03.9 HYPOTHYROIDISM, UNSPECIFIED: Chronic | Status: ACTIVE | Noted: 2020-10-01

## 2020-11-04 DIAGNOSIS — Z01.818 ENCOUNTER FOR OTHER PREPROCEDURAL EXAMINATION: ICD-10-CM

## 2020-11-06 ENCOUNTER — APPOINTMENT (OUTPATIENT)
Dept: DISASTER EMERGENCY | Facility: CLINIC | Age: 50
End: 2020-11-06

## 2020-11-07 LAB — SARS-COV-2 N GENE NPH QL NAA+PROBE: NOT DETECTED

## 2020-11-08 ENCOUNTER — RESULT REVIEW (OUTPATIENT)
Age: 50
End: 2020-11-08

## 2020-11-09 ENCOUNTER — RESULT REVIEW (OUTPATIENT)
Age: 50
End: 2020-11-09

## 2020-11-09 ENCOUNTER — OUTPATIENT (OUTPATIENT)
Dept: OUTPATIENT SERVICES | Facility: HOSPITAL | Age: 50
LOS: 1 days | End: 2020-11-09
Payer: COMMERCIAL

## 2020-11-09 VITALS
DIASTOLIC BLOOD PRESSURE: 68 MMHG | SYSTOLIC BLOOD PRESSURE: 117 MMHG | OXYGEN SATURATION: 99 % | HEART RATE: 81 BPM | RESPIRATION RATE: 18 BRPM

## 2020-11-09 VITALS — WEIGHT: 203.05 LBS | HEIGHT: 63 IN

## 2020-11-09 DIAGNOSIS — K86.2 CYST OF PANCREAS: ICD-10-CM

## 2020-11-09 DIAGNOSIS — Z98.890 OTHER SPECIFIED POSTPROCEDURAL STATES: Chronic | ICD-10-CM

## 2020-11-09 DIAGNOSIS — Z90.49 ACQUIRED ABSENCE OF OTHER SPECIFIED PARTS OF DIGESTIVE TRACT: Chronic | ICD-10-CM

## 2020-11-09 DIAGNOSIS — Z98.891 HISTORY OF UTERINE SCAR FROM PREVIOUS SURGERY: Chronic | ICD-10-CM

## 2020-11-09 DIAGNOSIS — K85.00 IDIOPATHIC ACUTE PANCREATITIS WITHOUT NECROSIS OR INFECTION: ICD-10-CM

## 2020-11-09 DIAGNOSIS — Z98.84 BARIATRIC SURGERY STATUS: Chronic | ICD-10-CM

## 2020-11-09 LAB — AMYLASE FLD-CCNC: 4525 U/L — SIGNIFICANT CHANGE UP

## 2020-11-09 PROCEDURE — 88173 CYTOPATH EVAL FNA REPORT: CPT

## 2020-11-09 PROCEDURE — 88305 TISSUE EXAM BY PATHOLOGIST: CPT | Mod: 26

## 2020-11-09 PROCEDURE — 88312 SPECIAL STAINS GROUP 1: CPT

## 2020-11-09 PROCEDURE — 88173 CYTOPATH EVAL FNA REPORT: CPT | Mod: 26

## 2020-11-09 PROCEDURE — 88305 TISSUE EXAM BY PATHOLOGIST: CPT | Mod: 26,59

## 2020-11-09 PROCEDURE — 88312 SPECIAL STAINS GROUP 1: CPT | Mod: 26

## 2020-11-09 PROCEDURE — 43242 EGD US FINE NEEDLE BX/ASPIR: CPT

## 2020-11-09 PROCEDURE — 82150 ASSAY OF AMYLASE: CPT

## 2020-11-09 PROCEDURE — 88305 TISSUE EXAM BY PATHOLOGIST: CPT

## 2020-11-09 PROCEDURE — 82378 CARCINOEMBRYONIC ANTIGEN: CPT

## 2020-11-09 PROCEDURE — 43239 EGD BIOPSY SINGLE/MULTIPLE: CPT | Mod: XS

## 2020-11-09 RX ORDER — SODIUM CHLORIDE 9 MG/ML
1000 INJECTION INTRAMUSCULAR; INTRAVENOUS; SUBCUTANEOUS
Refills: 0 | Status: DISCONTINUED | OUTPATIENT
Start: 2020-11-09 | End: 2020-11-24

## 2020-11-09 RX ADMIN — SODIUM CHLORIDE 75 MILLILITER(S): 9 INJECTION INTRAMUSCULAR; INTRAVENOUS; SUBCUTANEOUS at 15:48

## 2020-11-09 NOTE — ASU PATIENT PROFILE, ADULT - PMH
Blighted ovum    Blighted Ovum    Diabetes Mellitus Type II    DM (diabetes mellitus)    History of hypothyroidism    Hypothyroid    Latex sensitivity    Latex Sensitivity    Missed     Missed     Morbid obesity due to excess calories  BMI 38.1  Obesity    Obesity

## 2020-11-09 NOTE — PRE PROCEDURE NOTE - PRE PROCEDURE EVALUATION
Attending Physician:     Matthias Coon MD                       Procedure:    Indication for Procedure:  ________________________________________________________  PAST MEDICAL & SURGICAL HISTORY:  Obesity    Missed     Latex sensitivity    DM (diabetes mellitus)    Blighted ovum    Hypothyroid    History of hypothyroidism    Morbid obesity due to excess calories  BMI 38.1    Latex Sensitivity    Blighted Ovum    Obesity    Missed     Diabetes Mellitus Type II    H/O laparoscopic adjustable gastric banding    S/P LASIK surgery    History of cholecystectomy    H/O:     History of cervical spinal surgery  fusion, anterior approach    Status Post Gastric Banding  2011    Status Post Eye Surgery  lasik    S/P  Section  x 2    S/P Cholecystectomy      ALLERGIES:  latex (Other)  latex (Rash)  Novocain (Rash)    HOME MEDICATIONS:  acetaminophen 325 mg oral tablet: 2 tab(s) orally every 6 hours, As needed, Temp greater or equal to 38C (100.4F), Mild Pain (1 - 3)  levothyroxine 75 mcg (0.075 mg) oral tablet: 1 tab(s) orally once a day  melatonin 5 mg oral tablet: 1 tab(s) orally once a day (at bedtime) OTC PRN INSOMNIA  senna oral tablet: 2 tab(s) orally once a day (at bedtime) OTC    AICD/PPM: [ ] yes   [ ] no    PERTINENT LAB DATA:                      PHYSICAL EXAMINATION:    Height (cm): 160  Weight (kg): 92.1  BMI (kg/m2): 36  BSA (m2): 1.95T(C): --  HR: --  BP: --  RR: --  SpO2: --    Constitutional: NAD  HEENT: PERRLA, EOMI,    Neck:  No JVD  Respiratory: CTAB/L  Cardiovascular: S1 and S2  Gastrointestinal: BS+, soft, NT/ND  Extremities: No peripheral edema  Neurological: A/O x 3, no focal deficits  Psychiatric: Normal mood, normal affect  Skin: No rashes    ASA Class: I [ ]  II [ ]  III [ ]  IV [ ]    COMMENTS:    The patient is a suitable candidate for the planned procedure unless box checked [ ]  No, explain:

## 2020-11-09 NOTE — ASU DISCHARGE PLAN (ADULT/PEDIATRIC) - CARE PROVIDER_API CALL
Matthias Coon)  Gastroenterology  [address], [address]  [place] [address]  Phone: [phone]  Fax: [phone]  Follow Up Time:

## 2020-11-09 NOTE — ASU PATIENT PROFILE, ADULT - PSH
H/O laparoscopic adjustable gastric banding    H/O:     History of cervical spinal surgery  fusion, anterior approach  History of cholecystectomy    S/P  Section  x 2  S/P Cholecystectomy    S/P LASIK surgery    Status Post Eye Surgery  lasik  Status Post Gastric Banding  2011

## 2020-11-10 LAB — SURGICAL PATHOLOGY STUDY: SIGNIFICANT CHANGE UP

## 2020-11-11 LAB
CEA FLD-MCNC: 1420 NG/ML — SIGNIFICANT CHANGE UP
SPECIMEN SOURCE FLD: SIGNIFICANT CHANGE UP

## 2020-11-16 LAB — NON-GYNECOLOGICAL CYTOLOGY STUDY: SIGNIFICANT CHANGE UP

## 2021-04-28 ENCOUNTER — APPOINTMENT (OUTPATIENT)
Dept: ORTHOPEDIC SURGERY | Facility: CLINIC | Age: 51
End: 2021-04-28
Payer: COMMERCIAL

## 2021-04-28 VITALS
WEIGHT: 190 LBS | HEIGHT: 62 IN | DIASTOLIC BLOOD PRESSURE: 83 MMHG | HEART RATE: 86 BPM | BODY MASS INDEX: 34.96 KG/M2 | SYSTOLIC BLOOD PRESSURE: 120 MMHG

## 2021-04-28 DIAGNOSIS — M17.11 UNILATERAL PRIMARY OSTEOARTHRITIS, RIGHT KNEE: ICD-10-CM

## 2021-04-28 DIAGNOSIS — M79.604 PAIN IN RIGHT LEG: ICD-10-CM

## 2021-04-28 DIAGNOSIS — M25.571 PAIN IN RIGHT ANKLE AND JOINTS OF RIGHT FOOT: ICD-10-CM

## 2021-04-28 DIAGNOSIS — M54.16 RADICULOPATHY, LUMBAR REGION: ICD-10-CM

## 2021-04-28 PROCEDURE — 99072 ADDL SUPL MATRL&STAF TM PHE: CPT

## 2021-04-28 PROCEDURE — 73562 X-RAY EXAM OF KNEE 3: CPT | Mod: RT

## 2021-04-28 PROCEDURE — 99203 OFFICE O/P NEW LOW 30 MIN: CPT | Mod: 25

## 2021-04-28 PROCEDURE — 72100 X-RAY EXAM L-S SPINE 2/3 VWS: CPT

## 2021-04-28 PROCEDURE — 73610 X-RAY EXAM OF ANKLE: CPT | Mod: RT

## 2021-05-13 NOTE — PATIENT PROFILE ADULT - NSPROPTRIGHTNOTIFY_GEN_A_NUR
declines Fluconazole Counseling:  Patient counseled regarding adverse effects of fluconazole including but not limited to headache, diarrhea, nausea, upset stomach, liver function test abnormalities, taste disturbance, and stomach pain.  There is a rare possibility of liver failure that can occur when taking fluconazole.  The patient understands that monitoring of LFTs and kidney function test may be required, especially at baseline. The patient verbalized understanding of the proper use and possible adverse effects of fluconazole.  All of the patient's questions and concerns were addressed.

## 2021-05-20 ENCOUNTER — APPOINTMENT (OUTPATIENT)
Dept: MRI IMAGING | Facility: CLINIC | Age: 51
End: 2021-05-20
Payer: COMMERCIAL

## 2021-05-20 ENCOUNTER — OUTPATIENT (OUTPATIENT)
Dept: OUTPATIENT SERVICES | Facility: HOSPITAL | Age: 51
LOS: 1 days | End: 2021-05-20
Payer: COMMERCIAL

## 2021-05-20 DIAGNOSIS — Z98.84 BARIATRIC SURGERY STATUS: Chronic | ICD-10-CM

## 2021-05-20 DIAGNOSIS — Z98.890 OTHER SPECIFIED POSTPROCEDURAL STATES: Chronic | ICD-10-CM

## 2021-05-20 DIAGNOSIS — Z00.8 ENCOUNTER FOR OTHER GENERAL EXAMINATION: ICD-10-CM

## 2021-05-20 DIAGNOSIS — Z98.891 HISTORY OF UTERINE SCAR FROM PREVIOUS SURGERY: Chronic | ICD-10-CM

## 2021-05-20 DIAGNOSIS — Z90.49 ACQUIRED ABSENCE OF OTHER SPECIFIED PARTS OF DIGESTIVE TRACT: Chronic | ICD-10-CM

## 2021-05-20 PROCEDURE — 72148 MRI LUMBAR SPINE W/O DYE: CPT

## 2021-05-20 PROCEDURE — 72148 MRI LUMBAR SPINE W/O DYE: CPT | Mod: 26

## 2021-05-25 ENCOUNTER — APPOINTMENT (OUTPATIENT)
Dept: ORTHOPEDIC SURGERY | Facility: CLINIC | Age: 51
End: 2021-05-25
Payer: COMMERCIAL

## 2021-05-25 VITALS
SYSTOLIC BLOOD PRESSURE: 137 MMHG | WEIGHT: 190 LBS | HEIGHT: 62 IN | DIASTOLIC BLOOD PRESSURE: 78 MMHG | BODY MASS INDEX: 34.96 KG/M2 | HEART RATE: 82 BPM

## 2021-05-25 DIAGNOSIS — M54.12 RADICULOPATHY, CERVICAL REGION: ICD-10-CM

## 2021-05-25 DIAGNOSIS — M51.36 OTHER INTERVERTEBRAL DISC DEGENERATION, LUMBAR REGION: ICD-10-CM

## 2021-05-25 PROCEDURE — 99204 OFFICE O/P NEW MOD 45 MIN: CPT

## 2021-05-25 PROCEDURE — 72100 X-RAY EXAM L-S SPINE 2/3 VWS: CPT

## 2021-05-25 PROCEDURE — 99072 ADDL SUPL MATRL&STAF TM PHE: CPT

## 2021-05-25 PROCEDURE — 72040 X-RAY EXAM NECK SPINE 2-3 VW: CPT

## 2021-06-02 ENCOUNTER — APPOINTMENT (OUTPATIENT)
Dept: ORTHOPEDIC SURGERY | Facility: CLINIC | Age: 51
End: 2021-06-02
Payer: COMMERCIAL

## 2021-06-02 DIAGNOSIS — M17.11 UNILATERAL PRIMARY OSTEOARTHRITIS, RIGHT KNEE: ICD-10-CM

## 2021-06-02 PROCEDURE — 99072 ADDL SUPL MATRL&STAF TM PHE: CPT

## 2021-06-02 PROCEDURE — 99213 OFFICE O/P EST LOW 20 MIN: CPT

## 2021-06-02 RX ORDER — CLOBETASOL PROPIONATE 0.5 MG/ML
0.05 SOLUTION TOPICAL
Qty: 50 | Refills: 0 | Status: ACTIVE | COMMUNITY
Start: 2021-03-16

## 2021-06-02 RX ORDER — PEN NEEDLE, DIABETIC 32GX 5/32"
32G X 4 MM NEEDLE, DISPOSABLE MISCELLANEOUS
Qty: 100 | Refills: 0 | Status: ACTIVE | COMMUNITY
Start: 2021-02-16

## 2021-06-02 RX ORDER — HYDROCORTISONE 25 MG/G
2.5 CREAM TOPICAL
Qty: 28 | Refills: 0 | Status: ACTIVE | COMMUNITY
Start: 2021-03-16

## 2021-06-02 RX ORDER — ERGOCALCIFEROL 1.25 MG/1
1.25 MG CAPSULE, LIQUID FILLED ORAL
Qty: 4 | Refills: 0 | Status: ACTIVE | COMMUNITY
Start: 2021-03-10

## 2021-06-02 RX ORDER — KETOCONAZOLE 20 MG/G
2 CREAM TOPICAL
Qty: 30 | Refills: 0 | Status: ACTIVE | COMMUNITY
Start: 2021-03-16

## 2021-06-02 RX ORDER — BLOOD SUGAR DIAGNOSTIC
STRIP MISCELLANEOUS
Qty: 100 | Refills: 0 | Status: ACTIVE | COMMUNITY
Start: 2021-02-10

## 2021-06-02 RX ORDER — METOCLOPRAMIDE 10 MG/1
10 TABLET ORAL
Qty: 28 | Refills: 0 | Status: ACTIVE | COMMUNITY
Start: 2020-12-27

## 2021-06-02 RX ORDER — AMOXICILLIN AND CLAVULANATE POTASSIUM 875; 125 MG/1; MG/1
875-125 TABLET, COATED ORAL
Qty: 14 | Refills: 0 | Status: ACTIVE | COMMUNITY
Start: 2020-12-27

## 2021-06-02 RX ORDER — BLOOD-GLUCOSE METER
W/DEVICE KIT MISCELLANEOUS
Qty: 1 | Refills: 0 | Status: ACTIVE | COMMUNITY
Start: 2021-02-10

## 2021-06-02 RX ORDER — PANTOPRAZOLE 40 MG/1
40 TABLET, DELAYED RELEASE ORAL
Qty: 30 | Refills: 0 | Status: ACTIVE | COMMUNITY
Start: 2021-02-02

## 2021-06-02 RX ORDER — OXYCODONE 5 MG/1
5 TABLET ORAL
Qty: 12 | Refills: 0 | Status: ACTIVE | COMMUNITY
Start: 2020-12-27

## 2021-06-02 RX ORDER — NAPROXEN 500 MG/1
500 TABLET ORAL
Qty: 30 | Refills: 0 | Status: ACTIVE | COMMUNITY
Start: 2021-03-17

## 2021-06-02 RX ORDER — LANCETS 28 GAUGE
EACH MISCELLANEOUS
Qty: 100 | Refills: 0 | Status: ACTIVE | COMMUNITY
Start: 2021-02-10

## 2021-06-11 NOTE — ASU PATIENT PROFILE, ADULT - PAIN SCALE PREFERRED, PROFILE
Assessment/Plan:  1. Hyperbilirubinemia,   Hyperbilirubinemia status post phototherapy continues to do well.  He did peak later than anticipated however has responded well to phototherapy.  Continue to watch for any recurrence in the jaundice.  Continue to bottle feed 2-3 ounces every 3 hours on demand.  Follow-up weight check in approximately 2 weeks.    2. Cardiac murmur  I did hear cardiac murmur today.  It was present at the time of delivery but seems a little more pronounced.  He is having no respiratory distress, no family history of cardiac defects and he is currently feeding well.  I am referring him to pediatric cardiology for further evaluation and echocardiogram.  Discussed warning signs with mom.  If he is experiencing any difficulty with his feedings, any color changes or any respiratory symptoms he needs to be seen immediately for further evaluation.  - Ambulatory referral to Pediatric Cardiology      Subjective:  13-day-old brought in by mom today for follow-up of hyperbilirubinemia.  He was noted at his last office visit on day 7 of life to be jaundice.  Bilirubin level was 18.3.  Was rechecked 24 hours later and it increased to 20.3.  His risk factor was having a sibling with a history of jaundice and also bruising of his face.  Mom was B+ blood type, no infectious history, born at term via uncomplicated vaginal delivery.  He was admitted for phototherapy.  His bilirubin levels quickly fell to 12.3 at the time of discharge.  Through that time he was gaining weight adequately and has regained his birthweight.  He bottle feeds a combination of expressed breast milk and 20 kcal formula.  Since discharge, mom feels that he has been doing well.  He continues to bottle feed 2-3 ounces every 2-3 hours.  He has a wet diaper with every bottle and about 2 bowel movements a day.  He has not had any emesis.  He has no difficulty with his feeds.  No color changes are noted.  Mom notices that he still has  some mild jaundice to his face but the rest of the body continues to remain clear.    Objective:  Temp 97.6  F (36.4  C) (Temporal)   Wt 8 lb 1.6 oz (3.674 kg)  BMI 12.79 kg/m2  Alert and content in mom's arms  Sub-conjunctival hemorrhages are noted  Resolution of the facial bruising noted originally  Anterior fontanelle is soft and flat  Heart regular rate and rhythm with a systolic murmur heard  Lungs clear to auscultation bilaterally  Abdomen soft and nondistended  Skin shows some mild facial jaundice.     numerical 0-10

## 2021-06-16 ENCOUNTER — OUTPATIENT (OUTPATIENT)
Dept: OUTPATIENT SERVICES | Facility: HOSPITAL | Age: 51
LOS: 1 days | End: 2021-06-16
Payer: COMMERCIAL

## 2021-06-16 ENCOUNTER — APPOINTMENT (OUTPATIENT)
Dept: MRI IMAGING | Facility: CLINIC | Age: 51
End: 2021-06-16
Payer: COMMERCIAL

## 2021-06-16 DIAGNOSIS — Z98.890 OTHER SPECIFIED POSTPROCEDURAL STATES: Chronic | ICD-10-CM

## 2021-06-16 DIAGNOSIS — Z98.84 BARIATRIC SURGERY STATUS: Chronic | ICD-10-CM

## 2021-06-16 DIAGNOSIS — Z98.891 HISTORY OF UTERINE SCAR FROM PREVIOUS SURGERY: Chronic | ICD-10-CM

## 2021-06-16 DIAGNOSIS — Z90.49 ACQUIRED ABSENCE OF OTHER SPECIFIED PARTS OF DIGESTIVE TRACT: Chronic | ICD-10-CM

## 2021-06-16 DIAGNOSIS — Z00.8 ENCOUNTER FOR OTHER GENERAL EXAMINATION: ICD-10-CM

## 2021-06-16 PROCEDURE — 72141 MRI NECK SPINE W/O DYE: CPT | Mod: 26

## 2021-06-16 PROCEDURE — 72146 MRI CHEST SPINE W/O DYE: CPT

## 2021-06-16 PROCEDURE — 72146 MRI CHEST SPINE W/O DYE: CPT | Mod: 26

## 2021-06-16 PROCEDURE — 72141 MRI NECK SPINE W/O DYE: CPT

## 2021-08-23 NOTE — PROGRESS NOTE ADULT - PROBLEM SELECTOR PLAN 5
PATIENT IS COMPLAINT WITH DIET HEALTHY CHOICES ,SEEN BY ENDOCRINOLOGIST AND OUTPATIENT FOLLOWUP IS RECOMMENDED CORRECTIVE REGIMEN SLIDING SCALE .

## 2021-11-22 ENCOUNTER — APPOINTMENT (OUTPATIENT)
Dept: ENDOCRINOLOGY | Facility: CLINIC | Age: 51
End: 2021-11-22
Payer: COMMERCIAL

## 2021-11-22 VITALS
TEMPERATURE: 97.2 F | HEART RATE: 89 BPM | DIASTOLIC BLOOD PRESSURE: 68 MMHG | WEIGHT: 181 LBS | BODY MASS INDEX: 33.11 KG/M2 | OXYGEN SATURATION: 96 % | SYSTOLIC BLOOD PRESSURE: 118 MMHG

## 2021-11-22 DIAGNOSIS — Z00.00 ENCOUNTER FOR GENERAL ADULT MEDICAL EXAMINATION W/OUT ABNORMAL FINDINGS: ICD-10-CM

## 2021-11-22 DIAGNOSIS — E13.3591: ICD-10-CM

## 2021-11-22 LAB — HBA1C MFR BLD HPLC: 6.1

## 2021-11-22 PROCEDURE — 99205 OFFICE O/P NEW HI 60 MIN: CPT | Mod: 25

## 2021-11-22 PROCEDURE — 83036 HEMOGLOBIN GLYCOSYLATED A1C: CPT | Mod: QW

## 2021-11-22 RX ORDER — GLIMEPIRIDE 1 MG/1
1 TABLET ORAL
Qty: 30 | Refills: 0 | Status: DISCONTINUED | COMMUNITY
Start: 2021-03-11 | End: 2021-11-22

## 2021-11-22 RX ORDER — INSULIN ASPART 100 [IU]/ML
100 INJECTION, SOLUTION INTRAVENOUS; SUBCUTANEOUS
Qty: 24 | Refills: 0 | Status: DISCONTINUED | COMMUNITY
Start: 2021-02-15 | End: 2021-11-22

## 2021-12-02 LAB
25(OH)D3 SERPL-MCNC: 29 NG/ML
ALBUMIN SERPL ELPH-MCNC: 4.4 G/DL
ALP BLD-CCNC: 85 U/L
ALT SERPL-CCNC: 11 U/L
ANION GAP SERPL CALC-SCNC: 18 MMOL/L
AST SERPL-CCNC: 16 U/L
BILIRUB SERPL-MCNC: 0.2 MG/DL
BUN SERPL-MCNC: 14 MG/DL
C PEPTIDE SERPL-MCNC: 1.7 NG/ML
CALCIUM SERPL-MCNC: 10 MG/DL
CHLORIDE SERPL-SCNC: 104 MMOL/L
CHOLEST SERPL-MCNC: 208 MG/DL
CO2 SERPL-SCNC: 20 MMOL/L
CREAT SERPL-MCNC: 0.74 MG/DL
CREAT SPEC-SCNC: 91 MG/DL
HDLC SERPL-MCNC: 53 MG/DL
LDLC SERPL CALC-MCNC: 133 MG/DL
MICROALBUMIN 24H UR DL<=1MG/L-MCNC: <1.2 MG/DL
MICROALBUMIN/CREAT 24H UR-RTO: NORMAL MG/G
NONHDLC SERPL-MCNC: 155 MG/DL
POTASSIUM SERPL-SCNC: 4.6 MMOL/L
PROT SERPL-MCNC: 7.3 G/DL
SODIUM SERPL-SCNC: 142 MMOL/L
THYROGLOB AB SERPL-ACNC: <20 IU/ML
THYROPEROXIDASE AB SERPL IA-ACNC: 233 IU/ML
TRIGL SERPL-MCNC: 113 MG/DL
TSH SERPL-ACNC: 1.39 UIU/ML
VIT B12 SERPL-MCNC: 312 PG/ML

## 2021-12-07 ENCOUNTER — APPOINTMENT (OUTPATIENT)
Dept: RADIOLOGY | Facility: CLINIC | Age: 51
End: 2021-12-07
Payer: COMMERCIAL

## 2021-12-07 ENCOUNTER — OUTPATIENT (OUTPATIENT)
Dept: OUTPATIENT SERVICES | Facility: HOSPITAL | Age: 51
LOS: 1 days | End: 2021-12-07
Payer: COMMERCIAL

## 2021-12-07 DIAGNOSIS — Z00.8 ENCOUNTER FOR OTHER GENERAL EXAMINATION: ICD-10-CM

## 2021-12-07 DIAGNOSIS — Z98.891 HISTORY OF UTERINE SCAR FROM PREVIOUS SURGERY: Chronic | ICD-10-CM

## 2021-12-07 DIAGNOSIS — Z98.84 BARIATRIC SURGERY STATUS: Chronic | ICD-10-CM

## 2021-12-07 DIAGNOSIS — Z98.890 OTHER SPECIFIED POSTPROCEDURAL STATES: Chronic | ICD-10-CM

## 2021-12-07 DIAGNOSIS — Z90.49 ACQUIRED ABSENCE OF OTHER SPECIFIED PARTS OF DIGESTIVE TRACT: Chronic | ICD-10-CM

## 2021-12-07 PROCEDURE — 77080 DXA BONE DENSITY AXIAL: CPT | Mod: 26,76

## 2021-12-07 PROCEDURE — 77080 DXA BONE DENSITY AXIAL: CPT

## 2022-01-05 ENCOUNTER — TRANSCRIPTION ENCOUNTER (OUTPATIENT)
Age: 52
End: 2022-01-05

## 2022-02-23 ENCOUNTER — RX CHANGE (OUTPATIENT)
Age: 52
End: 2022-02-23

## 2022-04-18 ENCOUNTER — APPOINTMENT (OUTPATIENT)
Dept: ENDOCRINOLOGY | Facility: CLINIC | Age: 52
End: 2022-04-18

## 2022-05-31 ENCOUNTER — RX RENEWAL (OUTPATIENT)
Age: 52
End: 2022-05-31

## 2022-06-03 NOTE — ASU DISCHARGE PLAN (ADULT/PEDIATRIC) - NSDISCH_COLONOSCOPY_ENDO_ALL_CORE_FT
Patient seen 06/02/2022   If a colonoscopy was performed you might notice a few drops of blood on your underwear or you might see blood on the toilet paper after you use the bathroom. This is caused by irritation to the bowel during the procedure and is not a problem. However if you have any more heavy bleeding (more than 2 tablespoons of bright red blood) contact your doctor right away.

## 2022-07-06 ENCOUNTER — APPOINTMENT (OUTPATIENT)
Dept: ENDOCRINOLOGY | Facility: CLINIC | Age: 52
End: 2022-07-06

## 2022-09-06 ENCOUNTER — APPOINTMENT (OUTPATIENT)
Dept: ENDOCRINOLOGY | Facility: CLINIC | Age: 52
End: 2022-09-06

## 2022-09-06 ENCOUNTER — RESULT CHARGE (OUTPATIENT)
Age: 52
End: 2022-09-06

## 2022-09-06 VITALS
HEIGHT: 62 IN | WEIGHT: 187 LBS | OXYGEN SATURATION: 98 % | TEMPERATURE: 97.1 F | HEART RATE: 79 BPM | SYSTOLIC BLOOD PRESSURE: 126 MMHG | DIASTOLIC BLOOD PRESSURE: 80 MMHG | BODY MASS INDEX: 34.41 KG/M2

## 2022-09-06 DIAGNOSIS — E21.3 HYPERPARATHYROIDISM, UNSPECIFIED: ICD-10-CM

## 2022-09-06 DIAGNOSIS — Z82.3 FAMILY HISTORY OF STROKE: ICD-10-CM

## 2022-09-06 DIAGNOSIS — E78.5 HYPERLIPIDEMIA, UNSPECIFIED: ICD-10-CM

## 2022-09-06 LAB — GLUCOSE BLDC GLUCOMTR-MCNC: 124

## 2022-09-06 PROCEDURE — 82962 GLUCOSE BLOOD TEST: CPT

## 2022-09-06 PROCEDURE — 95251 CONT GLUC MNTR ANALYSIS I&R: CPT

## 2022-09-06 PROCEDURE — 99214 OFFICE O/P EST MOD 30 MIN: CPT | Mod: 25

## 2022-09-06 RX ORDER — FLUCONAZOLE 200 MG/1
200 TABLET ORAL
Qty: 1 | Refills: 0 | Status: DISCONTINUED | COMMUNITY
Start: 2020-12-27 | End: 2022-09-06

## 2022-09-06 RX ORDER — PIOGLITAZONE HYDROCHLORIDE 15 MG/1
15 TABLET ORAL
Qty: 90 | Refills: 0 | Status: DISCONTINUED | COMMUNITY
Start: 2021-02-12 | End: 2022-09-06

## 2022-09-06 RX ORDER — CELECOXIB 200 MG/1
200 CAPSULE ORAL DAILY
Qty: 30 | Refills: 0 | Status: DISCONTINUED | COMMUNITY
Start: 2021-06-02 | End: 2022-09-06

## 2022-09-06 NOTE — HISTORY OF PRESENT ILLNESS
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 \par \par Cholescystectomy related to cholecystitis/ choledocholithiasis.\par \par Previous Endo: Dr. Hernandes\par GI: Dr. Coon \par Occupation: works as an .  \par \par DM Disease Course:\par Prior to distal pancreatectomy patient had a strong history of pre-DM and family history of DM, after surgery was her official diagnosis of DM.  Of note her c-peptide levels were normal. \par \par DM\par A1c 6.1  11/2021\par A1c 6.8% 9/6/22\par \par Meter Readings:\par Paco 95% in range, 5% high, mostly after dinner. \par \par Diet:\par Tries to eat healthy, but working as an . \par Eats two meals daily. \par \par Exercise: works out an hour a day. \par states her weight is stable. \par Swims three times weekly. \par \par Current Medications: \par 1500 mg total daily dose Metformin - she states actos was stopped and Metformin was not titrated to 1500mg daily. \par \par Previous Medications:\par  glimperide - did not tolerate \par  insuln,  never started it and did not need it \par 15 mg pioglitzone -- patient states was stopped\par \par Optho:\par -Ophthalmology annually, referral given, no diabetic retinopathy. \par \par 2. hypothyroid\par levothyroxine 75mcg\par Taking regularly. \par \par 3.vitamin D \par low D levels in past \par 2000 units daily \par \par 4. Obesity:\par Lap band removed. \par \par 5. Hyperlipidemia\par Was started on Atorvastatin 20 mg but never took it. \par \par \par ROS: some decreased appetite, has GI follow up with Dr. Hernandes in the next week, endorses bloating. \par Not other symptoms. \par

## 2022-09-06 NOTE — END OF VISIT
Advance Care Planning     Advance Care Planning Inpatient Note  Spiritual Care Department    Today's Date: 8/3/2022  Unit: [facility] PROGRESSIVE CARE    Received request from family. Upon review of chart and communication with care team, Spiritual Care will defer advance care planning with patient at this time. Mario Jackman Phone call with healthcare decision maker/shawnee Piper Manjit. w    Goals of ACP Conversation:  Discuss advance care planning documents    Health Care Decision Makers:       Primary Decision Maker: Shane Kimece/Nephew - [phone]    Secondary Decision Maker: Kyara Maldonado - [phone]  Summary:  A[address] (Patient Wishes):  Healthcare Power of /Advance Directive Appointment of [address]  Living Will/Advance Directive  Shawnee has faxed copies of AD to the hospital.      Assessment:  Shawnee provided information from advance directives, LW and HCPOA, that were notarized on June 9, 2018, and that she faxed to the hospital for the patient's medical record.     Interventions:  Requested patient/family to submit existing document for our records: Healthcare Power of /Advance Directive Appointment of [address] Directive    Care Preferences Communicated:   No    Outcomes/Plan:  ACP Discussion: Completed    Electronically signed by Larissa Post [facility] on 8/3/2022 at 6:43 PM
[Time Spent: ___ minutes] : I have spent [unfilled] minutes of time on the encounter.

## 2022-09-06 NOTE — PHYSICAL EXAM
[Alert] : alert [Well Nourished] : well nourished [No Acute Distress] : no acute distress [EOMI] : extra ocular movement intact [No Respiratory Distress] : no respiratory distress [No Accessory Muscle Use] : no accessory muscle use [Clear to Auscultation] : lungs were clear to auscultation bilaterally [Normal S1, S2] : normal S1 and S2 [Normal Rate] : heart rate was normal [Regular Rhythm] : with a regular rhythm [Normal Gait] : normal gait [No Tremors] : no tremors [Oriented x3] : oriented to person, place, and time [Normal Insight/Judgement] : insight and judgment were intact [Normal Mood] : the mood was normal

## 2022-09-06 NOTE — ASSESSMENT
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 \par \par \par 1. DM \par A1c goal <6.5%, A1c increased from 6.1% to 6.8%\par  Increase metformin to 1000 mg BID (now taking 1500 mg daily)\par -Discussed recommend re-initite actos 15 mg daily, patient would like to hold off for now. \par -Weight management referral given\par -Ophthalmology annually, referral given, no diabetic retinopathy. \par -Avoid GLP-1 and DPP4 for the pancreas \par -Discussed long term macro/micro vascular complications of diabetes. \par -Continue exercise regimen and dietary modifications. \par \par 2. HTN\par  -BP goal - <130/80 , at goal today 9/6/22.\par - Check urine microalbumin ratio, goal <30 mg/g, previously normal. \par \par -b. HLD \par Not taking statin. \par Long discussion about the long term effects of diabetes and elevated cholesterol on micro/macro vascular health, discussed in this setting with a family history of DM and stroke, cardiovascular disease, strong recommendation for the role of statin in primary prevention.  She will consider after we discuss labs. \par \par - we discussed glycemic goals and need for close and routine followup, followup in 3 months, sooner if needed.\par -we discussed importance of self monitoring of blood glucose and to call if glucose <70 or >400 to make adjustments.\par -also to see GYN regarding pre-menopause/menopause

## 2022-09-07 ENCOUNTER — NON-APPOINTMENT (OUTPATIENT)
Age: 52
End: 2022-09-07

## 2022-09-07 LAB
25(OH)D3 SERPL-MCNC: 35.6 NG/ML
ALBUMIN SERPL ELPH-MCNC: 4.5 G/DL
ALP BLD-CCNC: 100 U/L
ALT SERPL-CCNC: 54 U/L
ANION GAP SERPL CALC-SCNC: 12 MMOL/L
AST SERPL-CCNC: 34 U/L
BILIRUB SERPL-MCNC: 0.3 MG/DL
BUN SERPL-MCNC: 14 MG/DL
C PEPTIDE SERPL-MCNC: 3 NG/ML
CA-I SERPL-SCNC: 5.1 MG/DL
CALCIUM SERPL-MCNC: 10.4 MG/DL
CALCIUM SERPL-MCNC: 10.4 MG/DL
CHLORIDE SERPL-SCNC: 102 MMOL/L
CHOLEST SERPL-MCNC: 233 MG/DL
CO2 SERPL-SCNC: 25 MMOL/L
CREAT SERPL-MCNC: 0.75 MG/DL
CREAT SPEC-SCNC: 105 MG/DL
EGFR: 97 ML/MIN/1.73M2
GLUCOSE SERPL-MCNC: 110 MG/DL
HDLC SERPL-MCNC: 50 MG/DL
LDLC SERPL CALC-MCNC: 152 MG/DL
MAGNESIUM SERPL-MCNC: 2.2 MG/DL
MICROALBUMIN 24H UR DL<=1MG/L-MCNC: <1.2 MG/DL
MICROALBUMIN/CREAT 24H UR-RTO: NORMAL MG/G
NONHDLC SERPL-MCNC: 183 MG/DL
PARATHYROID HORMONE INTACT: 32 PG/ML
PHOSPHATE SERPL-MCNC: 3.8 MG/DL
POTASSIUM SERPL-SCNC: 4.7 MMOL/L
PROT SERPL-MCNC: 7.7 G/DL
SODIUM SERPL-SCNC: 139 MMOL/L
T3 SERPL-MCNC: 106 NG/DL
T4 FREE SERPL-MCNC: 1.3 NG/DL
TRIGL SERPL-MCNC: 157 MG/DL
TSH SERPL-ACNC: 1.39 UIU/ML
VIT B12 SERPL-MCNC: 1227 PG/ML

## 2022-09-29 LAB — HBA1C MFR BLD HPLC: 6.8

## 2022-11-28 ENCOUNTER — APPOINTMENT (OUTPATIENT)
Dept: ENDOCRINOLOGY | Facility: CLINIC | Age: 52
End: 2022-11-28

## 2022-12-05 ENCOUNTER — RX RENEWAL (OUTPATIENT)
Age: 52
End: 2022-12-05

## 2023-01-17 ENCOUNTER — APPOINTMENT (OUTPATIENT)
Dept: ENDOCRINOLOGY | Facility: CLINIC | Age: 53
End: 2023-01-17

## 2023-02-14 ENCOUNTER — NON-APPOINTMENT (OUTPATIENT)
Age: 53
End: 2023-02-14

## 2023-02-15 ENCOUNTER — RX RENEWAL (OUTPATIENT)
Age: 53
End: 2023-02-15

## 2023-02-16 ENCOUNTER — NON-APPOINTMENT (OUTPATIENT)
Age: 53
End: 2023-02-16

## 2023-03-03 ENCOUNTER — APPOINTMENT (OUTPATIENT)
Dept: ENDOCRINOLOGY | Facility: CLINIC | Age: 53
End: 2023-03-03
Payer: COMMERCIAL

## 2023-03-03 VITALS
OXYGEN SATURATION: 98 % | SYSTOLIC BLOOD PRESSURE: 123 MMHG | BODY MASS INDEX: 34.23 KG/M2 | HEIGHT: 62 IN | WEIGHT: 186 LBS | DIASTOLIC BLOOD PRESSURE: 82 MMHG | HEART RATE: 68 BPM | TEMPERATURE: 97.9 F

## 2023-03-03 LAB
GLUCOSE BLDC GLUCOMTR-MCNC: 140
HBA1C MFR BLD HPLC: 7.1

## 2023-03-03 PROCEDURE — 83036 HEMOGLOBIN GLYCOSYLATED A1C: CPT | Mod: QW

## 2023-03-03 PROCEDURE — 99204 OFFICE O/P NEW MOD 45 MIN: CPT

## 2023-03-03 PROCEDURE — 82962 GLUCOSE BLOOD TEST: CPT

## 2023-03-03 NOTE — HISTORY OF PRESENT ILLNESS
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 \par \par Patient was taken off of pioglitazone by another provider after being told of potential of causing pancreatic cancer\par \par Self-reported weight gain\par Vision problems: stable \par \par High blood pressure: yes/ no \par Extreme hunger: yes/no \par Frequent and/or recurring urinary infections:  yes, frequent yeast infections\par Skin infections: no  \par Slow healing of cuts: no \par \par  \par Screening \par Ophthalmology: follows\par \par EGFR: 78\par \par \par Current diabetic medication regimen (verified with patient): \par metformin 1g po bid ER\par \par \par \par SMBG ranges (glucometer): \par \par recent fasting excursions to >140mg/dl

## 2023-03-07 ENCOUNTER — RX RENEWAL (OUTPATIENT)
Age: 53
End: 2023-03-07

## 2023-03-24 ENCOUNTER — NON-APPOINTMENT (OUTPATIENT)
Age: 53
End: 2023-03-24

## 2023-04-17 ENCOUNTER — APPOINTMENT (OUTPATIENT)
Dept: ENDOCRINOLOGY | Facility: CLINIC | Age: 53
End: 2023-04-17

## 2023-04-27 ENCOUNTER — APPOINTMENT (OUTPATIENT)
Dept: ENDOCRINOLOGY | Facility: CLINIC | Age: 53
End: 2023-04-27

## 2023-04-29 NOTE — H&P ADULT - PROBLEM SELECTOR PROBLEM 6
Vital Signs Last 24 Hrs  T(C): 37.1 (04-29-23 @ 07:04), Max: 37.1 (04-29-23 @ 07:04)  T(F): 98.8 (04-29-23 @ 07:04), Max: 98.8 (04-29-23 @ 07:04)  HR: --  BP: --  BP(mean): --  RR: --  SpO2: --    Orthostatic VS  04-29-23 @ 07:04  Lying BP: --/-- HR: --  Sitting BP: 133/81 HR: 94  Standing BP: 132/82 HR: 92  Site: --  Mode: --  Orthostatic VS  04-28-23 @ 19:56  Lying BP: --/-- HR: --  Sitting BP: 115/90 HR: 63  Standing BP: 115/94 HR: 75  Site: upper left arm  Mode: electronic  Orthostatic VS  04-28-23 @ 08:30  Lying BP: --/-- HR: --  Sitting BP: 146/90 HR: 77  Standing BP: 122/85 HR: 85  Site: --  Mode: --   Prophylactic measure

## 2023-06-09 ENCOUNTER — APPOINTMENT (OUTPATIENT)
Dept: ENDOCRINOLOGY | Facility: CLINIC | Age: 53
End: 2023-06-09
Payer: COMMERCIAL

## 2023-06-09 VITALS
WEIGHT: 180.5 LBS | OXYGEN SATURATION: 97 % | DIASTOLIC BLOOD PRESSURE: 74 MMHG | BODY MASS INDEX: 33.21 KG/M2 | HEART RATE: 96 BPM | TEMPERATURE: 97.9 F | HEIGHT: 62 IN | SYSTOLIC BLOOD PRESSURE: 114 MMHG

## 2023-06-09 LAB — HBA1C MFR BLD HPLC: 6.8

## 2023-06-09 PROCEDURE — 83036 HEMOGLOBIN GLYCOSYLATED A1C: CPT | Mod: QW

## 2023-06-09 PROCEDURE — 99214 OFFICE O/P EST MOD 30 MIN: CPT

## 2023-06-09 RX ORDER — FLASH GLUCOSE SENSOR
KIT MISCELLANEOUS
Qty: 6 | Refills: 0 | Status: DISCONTINUED | COMMUNITY
Start: 2021-02-12 | End: 2023-06-09

## 2023-06-09 NOTE — HISTORY OF PRESENT ILLNESS
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 \par \par Patient was taken off of pioglitazone by another provider after being told of potential of causing pancreatic cancer\par \par GLP-1/ DPP-4 I: patient discussed with gastroenterologist, and was considered given benefit in PETERS and fibrosis, obesity. At last visit had extensive discussion with patient, that ozempic per current data has not been studied in patient's with hx of pancreatitis, and there could be an association, however data/guidance is conflicting. Additionally there was a potential identifiable trigger for her pancreatitis, being a mass, which has been removed and she has not complained of upper GI symptoms recently. \par She is interested in associated weight loss and improvement of PETERS with GLP-1 therapy, and is interested in starting, after above was explained in detail\par \par At last visit was started on ozempic, after extensive discussion of risks vs benefits. \par \par Currently : \par No new complaints. \par \par \par  \par Screening \par Ophthalmology: follows\par urine alb: cr: -ve (9/2022)\par EGFR: 78\par \par \par Current diabetic medication regimen (verified with patient): \par metformin 1g po bid ER\par ozempic 0.5mg Q weekly \par \par reports mild constipation, improved with magnesium prn\par \par No nausea, no abdominal pain\par \par \par \par Hypothyroidism:\par \par no hx of thyroid surgery\par \par currently on LT4 75mcg po daily \par \par \par Obesity: \par 6lb weight loss \par \par \par \par \par \par \par

## 2023-06-12 ENCOUNTER — NON-APPOINTMENT (OUTPATIENT)
Age: 53
End: 2023-06-12

## 2023-06-12 LAB
25(OH)D3 SERPL-MCNC: 45.5 NG/ML
ALBUMIN SERPL ELPH-MCNC: 4.3 G/DL
CALCIUM SERPL-MCNC: 9.8 MG/DL
CALCIUM SERPL-MCNC: 9.8 MG/DL
CREAT SERPL-MCNC: 0.76 MG/DL
EGFR: 95 ML/MIN/1.73M2
PARATHYROID HORMONE INTACT: 28 PG/ML
T4 FREE SERPL-MCNC: 1.5 NG/DL
TSH SERPL-ACNC: 1.33 UIU/ML

## 2023-06-12 RX ORDER — LEVOTHYROXINE SODIUM 0.07 MG/1
75 TABLET ORAL DAILY
Qty: 90 | Refills: 1 | Status: ACTIVE | COMMUNITY
Start: 2023-06-12 | End: 1900-01-01

## 2023-06-15 ENCOUNTER — RX RENEWAL (OUTPATIENT)
Age: 53
End: 2023-06-15

## 2023-06-15 RX ORDER — ATORVASTATIN CALCIUM 20 MG/1
20 TABLET, FILM COATED ORAL DAILY
Qty: 90 | Refills: 0 | Status: ACTIVE | COMMUNITY
Start: 2021-12-03 | End: 1900-01-01

## 2023-08-21 ENCOUNTER — RX RENEWAL (OUTPATIENT)
Age: 53
End: 2023-08-21

## 2023-09-05 ENCOUNTER — APPOINTMENT (OUTPATIENT)
Dept: ENDOCRINOLOGY | Facility: CLINIC | Age: 53
End: 2023-09-05

## 2023-09-18 ENCOUNTER — RX RENEWAL (OUTPATIENT)
Age: 53
End: 2023-09-18

## 2023-10-10 ENCOUNTER — NON-APPOINTMENT (OUTPATIENT)
Age: 53
End: 2023-10-10

## 2023-12-06 ENCOUNTER — APPOINTMENT (OUTPATIENT)
Dept: ENDOCRINOLOGY | Facility: CLINIC | Age: 53
End: 2023-12-06

## 2024-02-04 ENCOUNTER — TRANSCRIPTION ENCOUNTER (OUTPATIENT)
Age: 54
End: 2024-02-04

## 2024-02-05 ENCOUNTER — APPOINTMENT (OUTPATIENT)
Dept: ENDOCRINOLOGY | Facility: CLINIC | Age: 54
End: 2024-02-05
Payer: COMMERCIAL

## 2024-02-05 VITALS
TEMPERATURE: 98.5 F | OXYGEN SATURATION: 98 % | BODY MASS INDEX: 32.8 KG/M2 | HEART RATE: 97 BPM | WEIGHT: 178.25 LBS | HEIGHT: 62 IN | DIASTOLIC BLOOD PRESSURE: 82 MMHG | SYSTOLIC BLOOD PRESSURE: 120 MMHG

## 2024-02-05 DIAGNOSIS — E03.9 HYPOTHYROIDISM, UNSPECIFIED: ICD-10-CM

## 2024-02-05 DIAGNOSIS — E66.9 OBESITY, UNSPECIFIED: ICD-10-CM

## 2024-02-05 DIAGNOSIS — E11.9 TYPE 2 DIABETES MELLITUS W/OUT COMPLICATIONS: ICD-10-CM

## 2024-02-05 LAB
GLUCOSE BLDC GLUCOMTR-MCNC: 158
HBA1C MFR BLD HPLC: 8

## 2024-02-05 PROCEDURE — 83036 HEMOGLOBIN GLYCOSYLATED A1C: CPT | Mod: QW

## 2024-02-05 PROCEDURE — 82962 GLUCOSE BLOOD TEST: CPT

## 2024-02-05 PROCEDURE — 99214 OFFICE O/P EST MOD 30 MIN: CPT

## 2024-02-05 RX ORDER — SEMAGLUTIDE 0.68 MG/ML
2 INJECTION, SOLUTION SUBCUTANEOUS
Qty: 3 | Refills: 0 | Status: ACTIVE | COMMUNITY
Start: 2023-03-03 | End: 1900-01-01

## 2024-02-05 RX ORDER — METFORMIN ER 500 MG 500 MG/1
500 TABLET ORAL
Qty: 360 | Refills: 2 | Status: ACTIVE | COMMUNITY
Start: 2021-05-07 | End: 1900-01-01

## 2024-02-05 RX ORDER — BLOOD-GLUCOSE SENSOR
EACH MISCELLANEOUS
Qty: 6 | Refills: 2 | Status: ACTIVE | COMMUNITY
Start: 2023-06-09 | End: 1900-01-01

## 2024-02-05 NOTE — HISTORY OF PRESENT ILLNESS
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   Patient was taken off of pioglitazone by another provider after being told of potential of causing pancreatic cancer  GLP-1/ DPP-4 I: patient discussed with gastroenterologist, and was considered given benefit in PETERS and fibrosis, obesity. At last visit had extensive discussion with patient, that ozempic per current data has not been studied in patient's with hx of pancreatitis, and there could be an association, however data/guidance is conflicting.   Additionally there was a potential identifiable trigger for her pancreatitis, being a mass, which has been removed and she has not complained of upper GI symptoms recently.  She is interested in associated weight loss and improvement of PETERS with GLP-1 therapy, and is interested in starting, after above was explained in detail  At last visit was started on ozempic, after extensive discussion of risks vs benefits.   Patient reported issues with insurance and had been off of ozempic for last month  recently noticed blood sugars increasing to low-mid 200's.   Currently :  No new complaints.      Screening  Ophthalmology: follows urine alb: cr: -ve (9/2022) EGFR: 78   Current diabetic medication regimen (verified with patient):  metformin 1g po bid ER ozempic 0.5mg Q weekly (patient ran out)     No nausea, no abdominal pain    Hypothyroidism:  no hx of thyroid surgery  currently on LT4 75mcg po daily    Obesity:  6lb weight loss

## 2024-02-06 ENCOUNTER — NON-APPOINTMENT (OUTPATIENT)
Age: 54
End: 2024-02-06

## 2024-02-06 LAB
ALBUMIN SERPL ELPH-MCNC: 4.5 G/DL
ALP BLD-CCNC: 99 U/L
ALT SERPL-CCNC: 28 U/L
AST SERPL-CCNC: 22 U/L
BILIRUB DIRECT SERPL-MCNC: 0.1 MG/DL
BILIRUB INDIRECT SERPL-MCNC: 0.2 MG/DL
BILIRUB SERPL-MCNC: 0.3 MG/DL
C PEPTIDE SERPL-MCNC: 5.2 NG/ML
CREAT SERPL-MCNC: 0.62 MG/DL
EGFR: 108 ML/MIN/1.73M2
ESTIMATED AVERAGE GLUCOSE: 171 MG/DL
HBA1C MFR BLD HPLC: 7.6 %
PROT SERPL-MCNC: 7.9 G/DL
T4 FREE SERPL-MCNC: 1.5 NG/DL
TSH SERPL-ACNC: 1.88 UIU/ML

## 2024-07-02 ENCOUNTER — APPOINTMENT (OUTPATIENT)
Dept: ENDOCRINOLOGY | Facility: CLINIC | Age: 54
End: 2024-07-02

## 2025-07-18 ENCOUNTER — EMERGENCY (EMERGENCY)
Facility: HOSPITAL | Age: 55
LOS: 1 days | End: 2025-07-18
Attending: EMERGENCY MEDICINE | Admitting: EMERGENCY MEDICINE
Payer: COMMERCIAL

## 2025-07-18 VITALS
WEIGHT: 184.97 LBS | DIASTOLIC BLOOD PRESSURE: 87 MMHG | OXYGEN SATURATION: 96 % | TEMPERATURE: 98 F | HEIGHT: 62 IN | RESPIRATION RATE: 18 BRPM | SYSTOLIC BLOOD PRESSURE: 128 MMHG | HEART RATE: 96 BPM

## 2025-07-18 VITALS
RESPIRATION RATE: 20 BRPM | DIASTOLIC BLOOD PRESSURE: 82 MMHG | SYSTOLIC BLOOD PRESSURE: 125 MMHG | HEART RATE: 84 BPM | OXYGEN SATURATION: 96 %

## 2025-07-18 DIAGNOSIS — Z98.84 BARIATRIC SURGERY STATUS: Chronic | ICD-10-CM

## 2025-07-18 DIAGNOSIS — Z98.890 OTHER SPECIFIED POSTPROCEDURAL STATES: Chronic | ICD-10-CM

## 2025-07-18 DIAGNOSIS — Z90.49 ACQUIRED ABSENCE OF OTHER SPECIFIED PARTS OF DIGESTIVE TRACT: Chronic | ICD-10-CM

## 2025-07-18 DIAGNOSIS — Z98.891 HISTORY OF UTERINE SCAR FROM PREVIOUS SURGERY: Chronic | ICD-10-CM

## 2025-07-18 LAB
ALBUMIN SERPL ELPH-MCNC: 3.2 G/DL — LOW (ref 3.3–5)
ALP SERPL-CCNC: 108 U/L — SIGNIFICANT CHANGE UP (ref 30–120)
ALT FLD-CCNC: 94 U/L — HIGH (ref 10–60)
ANION GAP SERPL CALC-SCNC: 9 MMOL/L — SIGNIFICANT CHANGE UP (ref 5–17)
AST SERPL-CCNC: 51 U/L — HIGH (ref 10–40)
BASOPHILS # BLD AUTO: 0.03 K/UL — SIGNIFICANT CHANGE UP (ref 0–0.2)
BASOPHILS # BLD MANUAL: 0.1 K/UL — SIGNIFICANT CHANGE UP (ref 0–0.2)
BASOPHILS NFR BLD AUTO: 0.3 % — SIGNIFICANT CHANGE UP (ref 0–2)
BASOPHILS NFR BLD MANUAL: 0.9 % — SIGNIFICANT CHANGE UP (ref 0–2)
BILIRUB SERPL-MCNC: 0.2 MG/DL — SIGNIFICANT CHANGE UP (ref 0.2–1.2)
BUN SERPL-MCNC: 15 MG/DL — SIGNIFICANT CHANGE UP (ref 7–23)
CALCIUM SERPL-MCNC: 9.1 MG/DL — SIGNIFICANT CHANGE UP (ref 8.4–10.5)
CHLORIDE SERPL-SCNC: 102 MMOL/L — SIGNIFICANT CHANGE UP (ref 96–108)
CK SERPL-CCNC: 184 U/L — SIGNIFICANT CHANGE UP (ref 26–192)
CO2 SERPL-SCNC: 27 MMOL/L — SIGNIFICANT CHANGE UP (ref 22–31)
CREAT SERPL-MCNC: 0.99 MG/DL — SIGNIFICANT CHANGE UP (ref 0.5–1.3)
D DIMER BLD IA.RAPID-MCNC: 168 NG/ML DDU — SIGNIFICANT CHANGE UP
EGFR: 68 ML/MIN/1.73M2 — SIGNIFICANT CHANGE UP
EGFR: 68 ML/MIN/1.73M2 — SIGNIFICANT CHANGE UP
EOSINOPHIL # BLD AUTO: 0.19 K/UL — SIGNIFICANT CHANGE UP (ref 0–0.5)
EOSINOPHIL # BLD MANUAL: 0.49 K/UL — SIGNIFICANT CHANGE UP (ref 0–0.5)
EOSINOPHIL NFR BLD AUTO: 1.7 % — SIGNIFICANT CHANGE UP (ref 0–6)
EOSINOPHIL NFR BLD MANUAL: 4.3 % — SIGNIFICANT CHANGE UP (ref 0–6)
GLUCOSE SERPL-MCNC: 214 MG/DL — HIGH (ref 70–99)
HCT VFR BLD CALC: 38.9 % — SIGNIFICANT CHANGE UP (ref 34.5–45)
HGB BLD-MCNC: 13.1 G/DL — SIGNIFICANT CHANGE UP (ref 11.5–15.5)
IMM GRANULOCYTES # BLD AUTO: 0.02 K/UL — SIGNIFICANT CHANGE UP (ref 0–0.07)
IMM GRANULOCYTES NFR BLD AUTO: 0.2 % — SIGNIFICANT CHANGE UP (ref 0–0.9)
LYMPHOCYTES # BLD AUTO: 4.4 K/UL — HIGH (ref 1–3.3)
LYMPHOCYTES # BLD MANUAL: 4.2 K/UL — HIGH (ref 1–3.3)
LYMPHOCYTES NFR BLD AUTO: 38.3 % — SIGNIFICANT CHANGE UP (ref 13–44)
LYMPHOCYTES NFR BLD MANUAL: 36.5 % — SIGNIFICANT CHANGE UP (ref 13–44)
MCHC RBC-ENTMCNC: 30.6 PG — SIGNIFICANT CHANGE UP (ref 27–34)
MCHC RBC-ENTMCNC: 33.7 G/DL — SIGNIFICANT CHANGE UP (ref 32–36)
MCV RBC AUTO: 90.9 FL — SIGNIFICANT CHANGE UP (ref 80–100)
MONOCYTES # BLD AUTO: 0.91 K/UL — HIGH (ref 0–0.9)
MONOCYTES # BLD MANUAL: 0.49 K/UL — SIGNIFICANT CHANGE UP (ref 0–0.9)
MONOCYTES NFR BLD AUTO: 7.9 % — SIGNIFICANT CHANGE UP (ref 2–14)
MONOCYTES NFR BLD MANUAL: 4.3 % — SIGNIFICANT CHANGE UP (ref 2–14)
NEUTROPHILS # BLD AUTO: 5.95 K/UL — SIGNIFICANT CHANGE UP (ref 1.8–7.4)
NEUTROPHILS # BLD MANUAL: 6.21 K/UL — SIGNIFICANT CHANGE UP (ref 1.8–7.4)
NEUTROPHILS NFR BLD AUTO: 51.6 % — SIGNIFICANT CHANGE UP (ref 43–77)
NEUTROPHILS NFR BLD MANUAL: 54 % — SIGNIFICANT CHANGE UP (ref 43–77)
NRBC # BLD AUTO: 0 K/UL — SIGNIFICANT CHANGE UP (ref 0–0)
NRBC # FLD: 0 K/UL — SIGNIFICANT CHANGE UP (ref 0–0)
NRBC BLD AUTO-RTO: 0 /100 WBCS — SIGNIFICANT CHANGE UP (ref 0–0)
PLAT MORPH BLD: NORMAL — SIGNIFICANT CHANGE UP
PLATELET # BLD AUTO: 377 K/UL — SIGNIFICANT CHANGE UP (ref 150–400)
PLATELET COUNT - ESTIMATE: NORMAL — SIGNIFICANT CHANGE UP
PMV BLD: 10 FL — SIGNIFICANT CHANGE UP (ref 7–13)
POTASSIUM SERPL-MCNC: 4 MMOL/L — SIGNIFICANT CHANGE UP (ref 3.5–5.3)
POTASSIUM SERPL-SCNC: 4 MMOL/L — SIGNIFICANT CHANGE UP (ref 3.5–5.3)
PROT SERPL-MCNC: 7.2 G/DL — SIGNIFICANT CHANGE UP (ref 6–8.3)
RBC # BLD: 4.28 M/UL — SIGNIFICANT CHANGE UP (ref 3.8–5.2)
RBC # FLD: 13.4 % — SIGNIFICANT CHANGE UP (ref 10.3–14.5)
RBC BLD AUTO: NORMAL — SIGNIFICANT CHANGE UP
SMUDGE CELLS # BLD: PRESENT
SODIUM SERPL-SCNC: 138 MMOL/L — SIGNIFICANT CHANGE UP (ref 135–145)
TROPONIN I, HIGH SENSITIVITY RESULT: 5.1 NG/L — SIGNIFICANT CHANGE UP
WBC # BLD: 11.5 K/UL — HIGH (ref 3.8–10.5)
WBC # FLD AUTO: 11.5 K/UL — HIGH (ref 3.8–10.5)

## 2025-07-18 PROCEDURE — 85025 COMPLETE CBC W/AUTO DIFF WBC: CPT

## 2025-07-18 PROCEDURE — 85379 FIBRIN DEGRADATION QUANT: CPT

## 2025-07-18 PROCEDURE — 36415 COLL VENOUS BLD VENIPUNCTURE: CPT

## 2025-07-18 PROCEDURE — 82550 ASSAY OF CK (CPK): CPT

## 2025-07-18 PROCEDURE — 93010 ELECTROCARDIOGRAM REPORT: CPT

## 2025-07-18 PROCEDURE — 71045 X-RAY EXAM CHEST 1 VIEW: CPT | Mod: 26

## 2025-07-18 PROCEDURE — 84484 ASSAY OF TROPONIN QUANT: CPT

## 2025-07-18 PROCEDURE — 99285 EMERGENCY DEPT VISIT HI MDM: CPT

## 2025-07-18 PROCEDURE — 93005 ELECTROCARDIOGRAM TRACING: CPT

## 2025-07-18 PROCEDURE — 80053 COMPREHEN METABOLIC PANEL: CPT

## 2025-07-18 PROCEDURE — 71045 X-RAY EXAM CHEST 1 VIEW: CPT

## 2025-07-18 PROCEDURE — 99285 EMERGENCY DEPT VISIT HI MDM: CPT | Mod: 25

## 2025-07-18 NOTE — ED PROVIDER NOTE - OBJECTIVE STATEMENT
55-year-old female with history of obesity, diabetes mellitus, hypothyroidism, status post splenectomy (removed when pancreatic cyst removed) presents for chest tightness since 8 AM this morning.  Describes pain is more of a tightness in nature.  Has been constant in nature. Denies any radiation of pain.  Seems to be worse with certain movements and palpation.  Has not taken anything over-the-counter for pain or symptoms.  Denies shortness of breath.  Denies any current leg pain or swelling.  Does report occasional leg cramping at night the past 6 weeks.  Denies any swelling.  Recently traveled to [place] in [facility], arrived home a day and a half ago.  No history of DVT or PE.  PCP Nate Bond

## 2025-07-18 NOTE — ED PROVIDER NOTE - PROGRESS NOTE DETAILS
Patient stable.  Resting comfortably.  Declines any pain medication.  No hypoxia, tachypnea, or tachycardia.  D-dimer negative.  Do not suspect pulmonary embolism or DVT.  EKG and troponin unremarkable.  Recommend outpatient follow-up with cardiology.  Referral provided if needed.  Will call to make appointment

## 2025-07-18 NOTE — ED PROVIDER NOTE - NSFOLLOWUPINSTRUCTIONS_ED_ALL_ED_FT
Tylenol or Motrin over-the-counter for pain discomfort  Have close follow-up with cardiology.  Referral provided needed.  Call to make appointment        Chest Pain    WHAT YOU NEED TO KNOW:    What do I need to know about chest pain? Chest pain can be caused by a range of conditions, from not serious to life-threatening. It is important to follow up with your healthcare provider to find the cause of your chest pain.    What may cause or increase my risk for chest pain?    A digestion problem, such as acid reflux or a stomach ulcer    An anxiety attack or a strong emotion, such as anger    Infection, inflammation, or a fracture in the bones or cartilage in your chest    Poor blood flow to your heart (angina)    A life-threatening condition, such as a heart attack or blood clot in your lungs  What other symptoms might I have with chest pain?    A burning feeling behind your breastbone    A racing or slow heartbeat    Fever or sweating    Nausea or vomiting    Shortness of breath    Discomfort or pressure that spreads from your chest to your back, jaw, or arm    Feeling weak, tired, or faint  How is the cause of chest pain diagnosed? Your healthcare provider will examine you. Describe your chest pain in as much detail as possible. Tell your provider where your pain is and when it began. Tell the provider if you notice anything that makes the pain worse or better. Tell your provider if it is constant or comes and goes. Also include any other symptoms you have with the chest pain, such as sweating or nausea. Your provider will ask about any medicines you use and medical conditions you have. Tell your provider if you have a family history of heart disease. You may also need any of the following tests:    An EKG is a test that records your heart's electrical activity.    Blood tests check for heart damage and signs of a heart attack.    An echocardiogram uses sound waves to see if blood is flowing normally through your heart.    An ultrasound, x-ray, CT, or MRI scan may show the cause of your chest pain. You may be given contrast liquid to help your heart show up better in the pictures. Tell the healthcare provider if you have ever had an allergic reaction to contrast liquid. Do not enter the MRI room with anything metal. Metal can cause serious injury. Tell the provider if you have any metal in or on your body.    An endoscopy may be done to check for ulcers or problem with your esophagus.  Upper Endoscopy  How is chest pain treated?    Medicines may be given to treat the cause of your chest pain. Examples include pain medicine, anxiety medicine, or medicines to increase blood flow to your heart. Do not take certain medicines without asking your healthcare provider first. These include NSAIDs, herbal or vitamin supplements, and hormones, such as estrogen or progestin.    A stent may be placed if your chest pain is caused by blockage in your heart. A stent is a wire mesh tube that helps hold your artery open. You may need more than 1 stent.  Coronary Artery Angioplasty (Stent)  What are some healthy living tips? If the cause of your chest pain is known, your healthcare provider will give you specific guidelines to follow. The following are general healthy guidelines:    Do not smoke. Nicotine and other chemicals in cigarettes and cigars can cause lung and heart damage. Ask your provider for information if you currently smoke and need help to quit. E-cigarettes or smokeless tobacco still contain nicotine. Talk to your provider before you use these products.    Choose a variety of healthy foods as often as possible. Include fresh, frozen, or canned fruits and vegetables. Also include low-fat dairy products, fish, chicken (without skin), and lean meats. Your provider or a dietitian can help you create meal plans. You may need to avoid certain foods or drinks if your pain is caused by a digestion problem.  Healthy Foods      Lower your sodium (salt) intake. Limit foods that are high in sodium, such as canned foods, salty snacks, and cold cuts. If you add salt when you cook food, do not add more at the table. Choose low-sodium canned foods as much as possible.        Drink plenty of water every day. Water helps your body to control your temperature and blood pressure. Ask your provider how much water you should drink every day.    Ask about activity. Your provider will tell you which activities to limit or avoid. Ask when you can drive, return to work, and have sex. Ask about the best exercise plan for you.    Maintain a healthy weight. Ask your provider what a healthy weight is for you. Ask your provider to help you create a safe weight loss plan if you are overweight.    Ask about vaccines you may need. Your provider can tell you if you also need vaccines not listed below, and when to get them:  Ask your healthcare provider about the flu and pneumonia vaccines. All adults should get the flu (influenza) vaccine as soon as recommended each year, usually in September or October. The pneumonia vaccine is recommended for all adults aged 50 or older to prevent pneumococcal disease, such as pneumonia. Adults aged 19 to 49 years who are at high risk for pneumococcal disease should also receive the vaccine. You may need 1 dose or 2. The number depends on the vaccine used and your risk factors.    COVID-19 vaccines are given to adults as a shot. At least 1 dose of an updated vaccine is recommended for all adults. COVID-19 vaccines are updated throughout the year. Adults 65 or older need a second dose of updated vaccine at least 4 months after the first dose. Your healthcare provider can help you schedule all needed doses as updated vaccines become available.  Prevent Heart Disease   Call your local emergency number (911 in the [place]) or have someone call if:    You have any of the following signs of a heart attack:  Squeezing, pressure, or pain in your chest    You may also have any of the following:  Discomfort or pain in your back, neck, jaw, stomach, or arm    Shortness of breath    Nausea or vomiting    Lightheadedness or a sudden cold sweat    When should I seek immediate care?    You have chest discomfort that gets worse, even with medicine.    You cough or vomit blood.    Your bowel movements are black or bloody.    You cannot stop vomiting, or it hurts to swallow.  When should I call my doctor?    You have questions or concerns about your condition or care.    CARE AGREEMENT:    You have the right to help plan your care. Learn about your health condition and how it may be treated. Discuss treatment options with your healthcare providers to decide what care you want to receive. You always have the right to refuse treatment.

## 2025-07-18 NOTE — ED PROVIDER NOTE - CARE PROVIDER_API CALL
Darryn Mcgarry)  Cardiovascular Disease  [address] [place] [address]  Phone: [phone]  Fax: [phone]  Follow Up Time: 1-3 Days

## 2025-07-18 NOTE — ED PROVIDER NOTE - NSICDXPASTMEDICALHX_GEN_ALL_CORE_FT
PAST MEDICAL HISTORY:  Blighted Ovum     Blighted ovum     Diabetes Mellitus Type II     DM (diabetes mellitus)     History of hypothyroidism     Hypothyroid     Latex Sensitivity     Latex sensitivity     Missed      Missed      Morbid obesity due to excess calories BMI 38.1    Obesity     Obesity

## 2025-07-18 NOTE — ED ADULT NURSE REASSESSMENT NOTE - NS ED NURSE REASSESS COMMENT FT1
MD and PA aware of all results. pt informed of same. d/c to self in NAD. discussed d/c instructions including follow up and OTC medications for pain

## 2025-07-18 NOTE — ED PROVIDER NOTE - CLINICAL SUMMARY MEDICAL DECISION MAKING FREE TEXT BOX
55-year-old female presents with has been having some chest discomfort in the mid chest since this morning.  The patient states that she is not short of breath.  No acute nausea or vomiting.  No acute dizziness.  The patient did have some recent travel, but has not been having any lower extremity edema or pain.  The patient does occasionally get some cramping in her legs which she has had over the past few weeks.  The pain is constant.  No relation to exertion or position.  No fever or chills.  No numbness or tingling in the arms or legs.  No cough/URI.  No recent trauma.  No aggravating or alleviating factors otherwise noted.  No other acute complaints.  The patient went to the gym today and did cardio, and some lifting and did not have any worsening symptoms, no associate shortness of breath or easy fatigue.  Exam: Nontoxic, well-appearing.  Normal respiratory effort.  CTA BL, no W/R/R.  Normal cardiac exam.  Abdomen soft, nontender, nondistended.  Some anterior chest wall tenderness, reproducible.  No crepitus.  No calf tenderness.  No lower extremity edema.  Nonfocal neurologic exam.  No other acute findings on exam.  Acute atypical chest pain, constant since this morning.  patient with recent travel.  Will check labs, x-ray, dimer, cardiac enzymes, reeval

## 2025-07-18 NOTE — ED ADULT NURSE NOTE - NS ED NURSE LEVEL OF CONSCIOUSNESS AFFECT
From: Pauline Barron  To: Nikki Cole NP  Sent: 6/3/2024 9:05 PM CDT  Subject: lost voice    Tomas Jacome,   I woke today with a sore throat . it progressed throughout the day, i lost my voice and looking at all the symptoms I have, I believe I have strep throat, Are you able to prescribe me an antibiotic or should I go to walk in the morning   Calm

## 2025-07-18 NOTE — ED PROVIDER NOTE - DIFFERENTIAL DIAGNOSIS
Differentials include but not limited to chest wall pain, pulmonary embolism, pericarditis, myocarditis, ACS, dehydration, electrolyte abnormality Differential Diagnosis

## 2025-07-18 NOTE — ED PROVIDER NOTE - NSICDXPASTSURGICALHX_GEN_ALL_CORE_FT
PAST SURGICAL HISTORY:  H/O laparoscopic adjustable gastric banding     H/O:      History of cervical spinal surgery fusion, anterior approach    History of cholecystectomy     S/P  Section x 2    S/P Cholecystectomy     S/P LASIK surgery     Status Post Eye Surgery lasik    Status Post Gastric Banding 2011

## 2025-07-18 NOTE — ED PROVIDER NOTE - PATIENT PORTAL LINK FT
You can access the FollowMyHealth Patient Portal offered by [facility] by registering at the following website: [email or web address]. By joining [employer]’s FollowMyHealth portal, you will also be able to view your health information using other applications (apps) compatible with our system.